# Patient Record
Sex: MALE | Race: WHITE | NOT HISPANIC OR LATINO | Employment: UNEMPLOYED | URBAN - METROPOLITAN AREA
[De-identification: names, ages, dates, MRNs, and addresses within clinical notes are randomized per-mention and may not be internally consistent; named-entity substitution may affect disease eponyms.]

---

## 2018-08-07 PROBLEM — F29 PSYCHOSIS: Status: ACTIVE | Noted: 2018-08-07

## 2018-08-10 PROBLEM — F19.922 SUBSTANCE-INDUCED PSYCHOTIC DISORDER WITH ONSET DURING INTOXICATION WITH DELUSION: Status: ACTIVE | Noted: 2018-08-07

## 2018-08-10 PROBLEM — Z87.820 HISTORY OF TRAUMATIC BRAIN INJURY: Chronic | Status: ACTIVE | Noted: 2018-08-10

## 2018-08-10 PROBLEM — F19.950 SUBSTANCE-INDUCED PSYCHOTIC DISORDER WITH ONSET DURING INTOXICATION WITH DELUSION: Status: ACTIVE | Noted: 2018-08-07

## 2018-08-10 PROBLEM — F12.20 CANNABIS USE DISORDER, MODERATE, DEPENDENCE: Chronic | Status: ACTIVE | Noted: 2018-08-10

## 2018-08-10 PROBLEM — F15.20: Chronic | Status: ACTIVE | Noted: 2018-08-10

## 2018-08-10 PROBLEM — F63.9 IMPULSE CONTROL DISORDER IN ADULT: Status: ACTIVE | Noted: 2018-08-10

## 2023-09-05 ENCOUNTER — TELEPHONE (OUTPATIENT)
Dept: PSYCHIATRY | Facility: CLINIC | Age: 43
End: 2023-09-05
Payer: MEDICAID

## 2023-09-05 NOTE — TELEPHONE ENCOUNTER
----- Message from Margarita Marcus sent at 9/5/2023  2:18 PM CDT -----  Pt request to be scheduled,Please call back at [phone].Thanks

## 2023-09-25 ENCOUNTER — TELEPHONE (OUTPATIENT)
Dept: PSYCHIATRY | Facility: CLINIC | Age: 43
End: 2023-09-25
Payer: MEDICAID

## 2023-09-25 NOTE — TELEPHONE ENCOUNTER
----- Message from Mayelin Grace sent at 9/25/2023  2:47 PM CDT -----  Type:  Sooner Apoointment Request    Caller is requesting a sooner appointment.  Caller declined first available appointment listed below.  Caller will not accept being placed on the waitlist and is requesting a message be sent to doctor.  Name of Caller:pt   When is the first available appointment?  Symptoms:adhd full evaluation  Would the patient rather a call back or a response via [facility]? Call   Best Call Back Number:[phone]  Additional Information: pt states he would like the first available appt asap.

## 2024-04-19 ENCOUNTER — HOSPITAL ENCOUNTER (EMERGENCY)
Facility: HOSPITAL | Age: 44
Discharge: PSYCHIATRIC OR SUBSTANCE USE FACILITY | End: 2024-04-20
Attending: EMERGENCY MEDICINE
Payer: MEDICAID

## 2024-04-19 DIAGNOSIS — R46.89 BEHAVIOR CONCERN: Primary | ICD-10-CM

## 2024-04-19 LAB
ALBUMIN SERPL BCP-MCNC: 4.3 G/DL (ref 3.5–5.2)
ALP SERPL-CCNC: 68 U/L (ref 55–135)
ALT SERPL W/O P-5'-P-CCNC: 34 U/L (ref 10–44)
AMPHET+METHAMPHET UR QL: ABNORMAL
ANION GAP SERPL CALC-SCNC: 10 MMOL/L (ref 8–16)
APAP SERPL-MCNC: <3 UG/ML (ref 10–20)
AST SERPL-CCNC: 44 U/L (ref 10–40)
BARBITURATES UR QL SCN>200 NG/ML: NEGATIVE
BASOPHILS # BLD AUTO: 0.03 K/UL (ref 0–0.2)
BASOPHILS NFR BLD: 0.4 % (ref 0–1.9)
BENZODIAZ UR QL SCN>200 NG/ML: NEGATIVE
BILIRUB SERPL-MCNC: 0.8 MG/DL (ref 0.1–1)
BILIRUB UR QL STRIP: NEGATIVE
BUN SERPL-MCNC: 18 MG/DL (ref 6–20)
BZE UR QL SCN: NEGATIVE
CALCIUM SERPL-MCNC: 9.7 MG/DL (ref 8.7–10.5)
CANNABINOIDS UR QL SCN: ABNORMAL
CHLORIDE SERPL-SCNC: 103 MMOL/L (ref 95–110)
CLARITY UR: CLEAR
CO2 SERPL-SCNC: 24 MMOL/L (ref 23–29)
COLOR UR: YELLOW
CREAT SERPL-MCNC: 1 MG/DL (ref 0.5–1.4)
CREAT UR-MCNC: 104.2 MG/DL (ref 23–375)
DIFFERENTIAL METHOD BLD: ABNORMAL
EOSINOPHIL # BLD AUTO: 0 K/UL (ref 0–0.5)
EOSINOPHIL NFR BLD: 0.3 % (ref 0–8)
ERYTHROCYTE [DISTWIDTH] IN BLOOD BY AUTOMATED COUNT: 12.6 % (ref 11.5–14.5)
EST. GFR  (NO RACE VARIABLE): >60 ML/MIN/1.73 M^2
ETHANOL SERPL-MCNC: <10 MG/DL
GLUCOSE SERPL-MCNC: 103 MG/DL (ref 70–110)
GLUCOSE UR QL STRIP: NEGATIVE
HCT VFR BLD AUTO: 40.6 % (ref 40–54)
HGB BLD-MCNC: 14.1 G/DL (ref 14–18)
HGB UR QL STRIP: NEGATIVE
IMM GRANULOCYTES # BLD AUTO: 0.02 K/UL (ref 0–0.04)
IMM GRANULOCYTES NFR BLD AUTO: 0.3 % (ref 0–0.5)
KETONES UR QL STRIP: NEGATIVE
LEUKOCYTE ESTERASE UR QL STRIP: ABNORMAL
LYMPHOCYTES # BLD AUTO: 1 K/UL (ref 1–4.8)
LYMPHOCYTES NFR BLD: 15.4 % (ref 18–48)
MCH RBC QN AUTO: 30.3 PG (ref 27–31)
MCHC RBC AUTO-ENTMCNC: 34.7 G/DL (ref 32–36)
MCV RBC AUTO: 87 FL (ref 82–98)
METHADONE UR QL SCN>300 NG/ML: NEGATIVE
MICROSCOPIC COMMENT: NORMAL
MONOCYTES # BLD AUTO: 0.8 K/UL (ref 0.3–1)
MONOCYTES NFR BLD: 11.8 % (ref 4–15)
NEUTROPHILS # BLD AUTO: 4.8 K/UL (ref 1.8–7.7)
NEUTROPHILS NFR BLD: 71.8 % (ref 38–73)
NITRITE UR QL STRIP: NEGATIVE
NRBC BLD-RTO: 0 /100 WBC
OPIATES UR QL SCN: NEGATIVE
PCP UR QL SCN>25 NG/ML: NEGATIVE
PH UR STRIP: 7 [PH] (ref 5–8)
PLATELET # BLD AUTO: 263 K/UL (ref 150–450)
PMV BLD AUTO: 9.5 FL (ref 9.2–12.9)
POTASSIUM SERPL-SCNC: 3.9 MMOL/L (ref 3.5–5.1)
PROT SERPL-MCNC: 7 G/DL (ref 6–8.4)
PROT UR QL STRIP: NEGATIVE
RBC # BLD AUTO: 4.66 M/UL (ref 4.6–6.2)
RBC #/AREA URNS HPF: 1 /HPF (ref 0–4)
SODIUM SERPL-SCNC: 137 MMOL/L (ref 136–145)
SP GR UR STRIP: 1.02 (ref 1–1.03)
TOXICOLOGY INFORMATION: ABNORMAL
TSH SERPL DL<=0.005 MIU/L-ACNC: 1.8 UIU/ML (ref 0.4–4)
URN SPEC COLLECT METH UR: ABNORMAL
UROBILINOGEN UR STRIP-ACNC: NEGATIVE EU/DL
WBC # BLD AUTO: 6.68 K/UL (ref 3.9–12.7)
WBC #/AREA URNS HPF: 2 /HPF (ref 0–5)

## 2024-04-19 PROCEDURE — 85025 COMPLETE CBC W/AUTO DIFF WBC: CPT | Performed by: EMERGENCY MEDICINE

## 2024-04-19 PROCEDURE — 96372 THER/PROPH/DIAG INJ SC/IM: CPT | Performed by: EMERGENCY MEDICINE

## 2024-04-19 PROCEDURE — 82077 ASSAY SPEC XCP UR&BREATH IA: CPT | Performed by: EMERGENCY MEDICINE

## 2024-04-19 PROCEDURE — 63600175 PHARM REV CODE 636 W HCPCS: Performed by: EMERGENCY MEDICINE

## 2024-04-19 PROCEDURE — 80307 DRUG TEST PRSMV CHEM ANLYZR: CPT | Performed by: EMERGENCY MEDICINE

## 2024-04-19 PROCEDURE — 99285 EMERGENCY DEPT VISIT HI MDM: CPT | Mod: 25

## 2024-04-19 PROCEDURE — 80143 DRUG ASSAY ACETAMINOPHEN: CPT | Performed by: EMERGENCY MEDICINE

## 2024-04-19 PROCEDURE — 80053 COMPREHEN METABOLIC PANEL: CPT | Performed by: EMERGENCY MEDICINE

## 2024-04-19 PROCEDURE — 81000 URINALYSIS NONAUTO W/SCOPE: CPT | Mod: 59 | Performed by: EMERGENCY MEDICINE

## 2024-04-19 PROCEDURE — 99204 OFFICE O/P NEW MOD 45 MIN: CPT | Mod: 95,AF,HB, | Performed by: PSYCHIATRY & NEUROLOGY

## 2024-04-19 PROCEDURE — 25000003 PHARM REV CODE 250: Performed by: EMERGENCY MEDICINE

## 2024-04-19 PROCEDURE — 84443 ASSAY THYROID STIM HORMONE: CPT | Performed by: EMERGENCY MEDICINE

## 2024-04-19 RX ORDER — DIPHENHYDRAMINE HYDROCHLORIDE 50 MG/ML
50 INJECTION INTRAMUSCULAR; INTRAVENOUS
Status: COMPLETED | OUTPATIENT
Start: 2024-04-19 | End: 2024-04-19

## 2024-04-19 RX ORDER — DIPHENHYDRAMINE HYDROCHLORIDE 50 MG/ML
25 INJECTION INTRAMUSCULAR; INTRAVENOUS
Status: COMPLETED | OUTPATIENT
Start: 2024-04-19 | End: 2024-04-19

## 2024-04-19 RX ORDER — ZIPRASIDONE MESYLATE 20 MG/ML
20 INJECTION, POWDER, LYOPHILIZED, FOR SOLUTION INTRAMUSCULAR
Status: COMPLETED | OUTPATIENT
Start: 2024-04-19 | End: 2024-04-19

## 2024-04-19 RX ORDER — LORAZEPAM 2 MG/ML
2 INJECTION INTRAMUSCULAR
Status: COMPLETED | OUTPATIENT
Start: 2024-04-19 | End: 2024-04-19

## 2024-04-19 RX ORDER — OLANZAPINE 5 MG/1
10 TABLET ORAL DAILY
Status: DISCONTINUED | OUTPATIENT
Start: 2024-04-19 | End: 2024-04-20 | Stop reason: HOSPADM

## 2024-04-19 RX ADMIN — LORAZEPAM 2 MG: 2 INJECTION INTRAMUSCULAR; INTRAVENOUS at 03:04

## 2024-04-19 RX ADMIN — DIPHENHYDRAMINE HYDROCHLORIDE 50 MG: 50 INJECTION, SOLUTION INTRAMUSCULAR; INTRAVENOUS at 03:04

## 2024-04-19 RX ADMIN — ZIPRASIDONE MESYLATE 20 MG: 20 INJECTION, POWDER, LYOPHILIZED, FOR SOLUTION INTRAMUSCULAR at 11:04

## 2024-04-19 RX ADMIN — OLANZAPINE 10 MG: 5 TABLET, FILM COATED ORAL at 02:04

## 2024-04-19 RX ADMIN — DIPHENHYDRAMINE HYDROCHLORIDE 25 MG: 50 INJECTION, SOLUTION INTRAMUSCULAR; INTRAVENOUS at 11:04

## 2024-04-19 NOTE — ED NOTES
Belongings: , purple lighter, vape pen, blue shirt,  khaki pants, socks, brown  shoes.. black wallet ( $16 dollars in cash).   Belongings placed outside of PEC room with sitter.

## 2024-04-19 NOTE — ED NOTES
"Patient stated to nurse "I will leave this place in the next 20 minutes if I don't talk to a psychiatrist. Patient notified that he cannot leave until cleared by a physician, and that a psychiatrist will see him shortly. Patient then stated "I am only waiting for as long as I said and I will leave out of here" MD notified.  "

## 2024-04-19 NOTE — ED PROVIDER NOTES
"SCRIBE #1 NOTE: I, Adam Dunn, am scribing for, and in the presence of, Stan White DO. I have scribed the entire note.      History      Chief Complaint   Patient presents with    Psychiatric Evaluation     Pt was sent by his psychiatrist because he has been acting unusual.  Mother states he is acting like he's in a manic state.  He has not been sleeping well.  Pt denies current SI and HI.       Review of patient's allergies indicates:  No Known Allergies     HPI   HPI    4/19/2024, 1:21 PM   History obtained from the patient and mother      History of Present Illness: Stan Montiel is a 43 y.o. male patient who presents to the Emergency Department for psychiatric evaluation. Mother states that the pt was referred to the ED by his Psychiatry following a TeleHealth visit, as the pt was exhibiting rambling speech and manic behavior. Mother states that the pt has had consistent trouble sleeping and rambling speech since returning from a work trip to [place] 2-3 weeks ago. Pt expressed that he would like to be dx with ADHD.  Symptoms are constant and moderate in severity. No mitigating or exacerbating factors reported. No associated sxs reported. Patient denies any SI, HI, hallucinations, and all other sxs at this time. Per mother, pt has been compliant with his home medications. No further complaints or concerns at this time.     Arrival mode: Personal vehicle     PCP: No, Primary Doctor       Past Medical History:  Past Medical History:   Diagnosis Date    ADHD     Amphetamine use disorder, severe, in controlled environment 8/10/2018    Anxiety     Bipolar 1 disorder     Depression     Hx of psychiatric care     Multiple head injury     multiple episodes of "falling off of slides"    Multiple personality     Psychiatric problem     Psychosis 8/7/2018    Testicular cancer     in remission, occurred approximately 10 years ago    Therapy        Past Surgical History:  No past surgical history on file. "      Family History:  Family History   Problem Relation Name Age of Onset    No Known Problems Mother      No Known Problems Father      No Known Problems Sister      Anxiety disorder Brother      Depression Brother      Drug abuse Brother      No Known Problems Maternal Aunt      No Known Problems Paternal Aunt      No Known Problems Maternal Uncle      No Known Problems Paternal Uncle      No Known Problems Maternal Grandfather      No Known Problems Maternal Grandmother      No Known Problems Paternal Grandfather      No Known Problems Paternal Grandmother      No Known Problems Cousin         Social History:  Social History     Tobacco Use    Smoking status: Heavy Smoker     Current packs/day: 2.00     Average packs/day: 2.0 packs/day for 1 year (2.0 ttl pk-yrs)     Types: Cigarettes    Smokeless tobacco: Never   Substance and Sexual Activity    Alcohol use: No    Drug use: No    Sexual activity: Yes     Partners: Female, Male     Birth control/protection: None       ROS   Review of Systems   Psychiatric/Behavioral:  Positive for behavioral problems and sleep disturbance. Negative for hallucinations and suicidal ideas. The patient is hyperactive.         (-) HI     Physical Exam      Initial Vitals [04/19/24 1254]   BP Pulse Resp Temp SpO2   127/84 (!) 124 18 98.4 °F (36.9 °C) 99 %      MAP       --          Physical Exam  Vitals reviewed.   Cardiovascular:      Comments: Tachycardic rate, regular rhythm, no murmurs  Pulmonary:      Effort: Pulmonary effort is normal.      Breath sounds: No wheezing.   Abdominal:      Palpations: Abdomen is soft.      Tenderness: There is no abdominal tenderness.   Musculoskeletal:         General: Normal range of motion.   Skin:     General: Skin is warm and dry.   Neurological:      General: No focal deficit present.      Mental Status: He is alert and oriented to person, place, and time.   Psychiatric:      Comments: Rambling thoughts, appears to be manic behavior,  "cooperative           ED Course    Critical Care    Date/Time: 4/19/2024 7:00 PM    Performed by: Stan White DO  Authorized by: Stan White DO  Direct patient critical care time: 20 minutes  Additional history critical care time: 5 minutes  Ordering / reviewing critical care time: 5 minutes  Documentation critical care time: 5 minutes  Consulting other physicians critical care time: 5 minutes  Total critical care time (exclusive of procedural time) : 40 minutes  Critical care time was exclusive of separately billable procedures and treating other patients.  Critical care was necessary to treat or prevent imminent or life-threatening deterioration of the following conditions: combative psychiatric patient.  Critical care was time spent personally by me on the following activities: evaluation of patient's response to treatment, examination of patient, ordering and performing treatments and interventions, re-evaluation of patient's condition and review of old charts.        ED Vital Signs:  Vitals:    04/19/24 1254 04/19/24 1400 04/19/24 1835   BP: 127/84  113/65   Pulse: (!) 124 100 90   Resp: 18  18   Temp: 98.4 °F (36.9 °C)  98.3 °F (36.8 °C)   TempSrc: Oral  Oral   SpO2: 99%  97%   Weight: 61.1 kg (134 lb 11.2 oz)     Height: 5' 10" (1.778 m)         Abnormal Lab Results:  Labs Reviewed   CBC W/ AUTO DIFFERENTIAL - Abnormal; Notable for the following components:       Result Value    Lymph % 15.4 (*)     All other components within normal limits   COMPREHENSIVE METABOLIC PANEL - Abnormal; Notable for the following components:    AST 44 (*)     All other components within normal limits   URINALYSIS, REFLEX TO URINE CULTURE - Abnormal; Notable for the following components:    Leukocytes, UA 1+ (*)     All other components within normal limits    Narrative:     Specimen Source->Urine   DRUG SCREEN PANEL, URINE EMERGENCY - Abnormal; Notable for the following components:    Amphetamine Screen, Ur " Presumptive Positive (*)     THC Presumptive Positive (*)     All other components within normal limits    Narrative:     Specimen Source->Urine   ACETAMINOPHEN LEVEL - Abnormal; Notable for the following components:    Acetaminophen (Tylenol), Serum <3.0 (*)     All other components within normal limits   TSH   ALCOHOL,MEDICAL (ETHANOL)   URINALYSIS MICROSCOPIC    Narrative:     Specimen Source->Urine        All Lab Results:  Results for orders placed or performed during the hospital encounter of 04/19/24   CBC auto differential   Result Value Ref Range    WBC 6.68 3.90 - 12.70 K/uL    RBC 4.66 4.60 - 6.20 M/uL    Hemoglobin 14.1 14.0 - 18.0 g/dL    Hematocrit 40.6 40.0 - 54.0 %    MCV 87 82 - 98 fL    MCH 30.3 27.0 - 31.0 pg    MCHC 34.7 32.0 - 36.0 g/dL    RDW 12.6 11.5 - 14.5 %    Platelets 263 150 - 450 K/uL    MPV 9.5 9.2 - 12.9 fL    Immature Granulocytes 0.3 0.0 - 0.5 %    Gran # (ANC) 4.8 1.8 - 7.7 K/uL    Immature Grans (Abs) 0.02 0.00 - 0.04 K/uL    Lymph # 1.0 1.0 - 4.8 K/uL    Mono # 0.8 0.3 - 1.0 K/uL    Eos # 0.0 0.0 - 0.5 K/uL    Baso # 0.03 0.00 - 0.20 K/uL    nRBC 0 0 /100 WBC    Gran % 71.8 38.0 - 73.0 %    Lymph % 15.4 (L) 18.0 - 48.0 %    Mono % 11.8 4.0 - 15.0 %    Eosinophil % 0.3 0.0 - 8.0 %    Basophil % 0.4 0.0 - 1.9 %    Differential Method Automated    Comprehensive metabolic panel   Result Value Ref Range    Sodium 137 136 - 145 mmol/L    Potassium 3.9 3.5 - 5.1 mmol/L    Chloride 103 95 - 110 mmol/L    CO2 24 23 - 29 mmol/L    Glucose 103 70 - 110 mg/dL    BUN 18 6 - 20 mg/dL    Creatinine 1.0 0.5 - 1.4 mg/dL    Calcium 9.7 8.7 - 10.5 mg/dL    Total Protein 7.0 6.0 - 8.4 g/dL    Albumin 4.3 3.5 - 5.2 g/dL    Total Bilirubin 0.8 0.1 - 1.0 mg/dL    Alkaline Phosphatase 68 55 - 135 U/L    AST 44 (H) 10 - 40 U/L    ALT 34 10 - 44 U/L    eGFR >60 >60 mL/min/1.73 m^2    Anion Gap 10 8 - 16 mmol/L   TSH   Result Value Ref Range    TSH 1.798 0.400 - 4.000 uIU/mL   Urinalysis, Reflex to Urine  Culture Urine, Clean Catch    Specimen: Urine   Result Value Ref Range    Specimen UA Urine, Clean Catch     Color, UA Yellow Yellow, Straw, Georgina    Appearance, UA Clear Clear    pH, UA 7.0 5.0 - 8.0    Specific Gravity, UA 1.020 1.005 - 1.030    Protein, UA Negative Negative    Glucose, UA Negative Negative    Ketones, UA Negative Negative    Bilirubin (UA) Negative Negative    Occult Blood UA Negative Negative    Nitrite, UA Negative Negative    Urobilinogen, UA Negative <2.0 EU/dL    Leukocytes, UA 1+ (A) Negative   Drug screen panel, emergency   Result Value Ref Range    Benzodiazepines Negative Negative    Methadone metabolites Negative Negative    Cocaine (Metab.) Negative Negative    Opiate Scrn, Ur Negative Negative    Barbiturate Screen, Ur Negative Negative    Amphetamine Screen, Ur Presumptive Positive (A) Negative    THC Presumptive Positive (A) Negative    Phencyclidine Negative Negative    Creatinine, Urine 104.2 23.0 - 375.0 mg/dL    Toxicology Information SEE COMMENT    Ethanol   Result Value Ref Range    Alcohol, Serum <10 <10 mg/dL   Acetaminophen level   Result Value Ref Range    Acetaminophen (Tylenol), Serum <3.0 (L) 10.0 - 20.0 ug/mL   Urinalysis Microscopic   Result Value Ref Range    RBC, UA 1 0 - 4 /hpf    WBC, UA 2 0 - 5 /hpf    Microscopic Comment SEE COMMENT      Imaging Results:  Imaging Results    None                 The Emergency Provider reviewed the vital signs and test results, which are outlined above.    ED Discussion     1:24 PM: The PEC hold has been issued by Dr. White at this time, as the pt is gravely disabled.    6:55 PM: Discussed pt's case with Dr. Ro (Psychiatry via TelePsych consult), who evaluated pt at bedside and recommends continuing the PEC, inpatient psychiatric admission.    6:56 PM: Pt has been medically cleared by Dr. White at this time. Reassessed pt at this time. Pt is resting comfortably and appears in no acute distress. There are no psychiatric  services offered at this facility. D/w pt all pertinent ED information and plan to transfer to psychiatric facility for psychiatric treatment. Pt verbalizes understanding. Patient being transferred by [facility] for ongoing personal protection en route. Pt has been made aware of all risks and benefits associated with transfer, including but not limited to death, MVC, loss of vital signs, and/or permanent disability. Benefits include ability to be treated at an inpatient psychiatric facility. Pt will be transported by personnel trained in CPR and CPI. Patient understands that there could be unforeseen motor vehicle accidents, inclement weather, or loss of vital signs that could result in potential death or permanent disability. All questions and complaints have been addressed at this time. Pt condition is stable at this time and is clear to transfer to psychiatric facility at this time.     ED Course as of 04/19/24 1858 Fri Apr 19, 2024   1423 Acetaminophen level(!)  Within normal limits [CD]   1424 Comprehensive metabolic panel(!)  Nonspecific findings [CD]   1424 Ethanol  Within normal limits [CD]   1424 CBC auto differential(!)  Nonspecific findings [CD]   1424 Drug screen panel, emergency(!)  Positive for amphetamine and marijuana [CD]   1424 Urinalysis, Reflex to Urine Culture Urine, Clean Catch(!)  No UTI [CD]   1855 TSH  Within normal limits [CD]   1855 Patient medically cleared for psychiatric evaluation [CD]      ED Course User Index  [CD] Stan White, DO       ED Medication(s):  Medications   OLANZapine tablet 10 mg (10 mg Oral Given 4/19/24 1445)   diphenhydrAMINE injection 50 mg (50 mg Intramuscular Given 4/19/24 1536)   LORazepam injection 2 mg (2 mg Intramuscular Given 4/19/24 1536)     New Prescriptions    No medications on file           Medical Decision Making    Medical Decision Making  Amount and/or Complexity of Data Reviewed  Independent Historian: parent  Labs: ordered. Decision-making  details documented in ED Course.    Risk  Prescription drug management.  Risk Details: Differential diagnosis includes but is not limited to:  Noncompliance with psychiatric medications, substance abuse                Scribe Attestation:   Scribe #1: I performed the above scribed service and the documentation accurately describes the services I performed. I attest to the accuracy of the note.    Attending:   Physician Attestation Statement for Scribe #1: I, Stan White DO, personally performed the services described in this documentation, as scribed by Adam Dunn, in my presence, and it is both accurate and complete.          Clinical Impression       ICD-10-CM ICD-9-CM   1. Behavior concern  R46.89 V40.9       Disposition:   Disposition: Transferred  Condition: Stable         Stan White DO  04/19/24 1859       Stan White DO  05/03/24 1019
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
## 2024-04-20 ENCOUNTER — HOSPITAL ENCOUNTER (INPATIENT)
Facility: HOSPITAL | Age: 44
LOS: 4 days | Discharge: HOME | DRG: 885 | End: 2024-04-24
Attending: PSYCHIATRY & NEUROLOGY | Admitting: PSYCHIATRY & NEUROLOGY
Payer: MEDICAID

## 2024-04-20 VITALS
DIASTOLIC BLOOD PRESSURE: 70 MMHG | WEIGHT: 134.69 LBS | RESPIRATION RATE: 16 BRPM | HEART RATE: 108 BPM | OXYGEN SATURATION: 98 % | HEIGHT: 70 IN | BODY MASS INDEX: 19.28 KG/M2 | TEMPERATURE: 99 F | SYSTOLIC BLOOD PRESSURE: 108 MMHG

## 2024-04-20 DIAGNOSIS — F29 PSYCHOSIS: ICD-10-CM

## 2024-04-20 DIAGNOSIS — F19.94 SUBSTANCE INDUCED MOOD DISORDER: Primary | ICD-10-CM

## 2024-04-20 PROBLEM — F41.1 GAD (GENERALIZED ANXIETY DISORDER): Status: ACTIVE | Noted: 2024-04-20

## 2024-04-20 PROBLEM — F19.959 PSYCHOACTIVE SUBSTANCE-INDUCED PSYCHOSIS: Status: ACTIVE | Noted: 2018-08-07

## 2024-04-20 PROCEDURE — 63600175 PHARM REV CODE 636 W HCPCS: Performed by: PSYCHIATRY & NEUROLOGY

## 2024-04-20 PROCEDURE — 25000003 PHARM REV CODE 250: Performed by: PSYCHIATRY & NEUROLOGY

## 2024-04-20 PROCEDURE — 99222 1ST HOSP IP/OBS MODERATE 55: CPT | Mod: AF,HB,, | Performed by: PSYCHIATRY & NEUROLOGY

## 2024-04-20 PROCEDURE — 11400000 HC PSYCH PRIVATE ROOM

## 2024-04-20 RX ORDER — DIPHENHYDRAMINE HCL 50 MG
50 CAPSULE ORAL EVERY 4 HOURS PRN
Status: DISCONTINUED | OUTPATIENT
Start: 2024-04-20 | End: 2024-04-20

## 2024-04-20 RX ORDER — HALOPERIDOL 5 MG/ML
5 INJECTION INTRAMUSCULAR EVERY 4 HOURS PRN
Status: DISCONTINUED | OUTPATIENT
Start: 2024-04-20 | End: 2024-04-20

## 2024-04-20 RX ORDER — DIPHENHYDRAMINE HYDROCHLORIDE 50 MG/ML
50 INJECTION INTRAMUSCULAR; INTRAVENOUS EVERY 4 HOURS PRN
Status: DISCONTINUED | OUTPATIENT
Start: 2024-04-20 | End: 2024-04-20

## 2024-04-20 RX ORDER — HALOPERIDOL 5 MG/1
5 TABLET ORAL EVERY 4 HOURS PRN
Status: DISCONTINUED | OUTPATIENT
Start: 2024-04-20 | End: 2024-04-20

## 2024-04-20 RX ORDER — OLANZAPINE 5 MG/1
5 TABLET ORAL 2 TIMES DAILY
Status: DISCONTINUED | OUTPATIENT
Start: 2024-04-20 | End: 2024-04-23

## 2024-04-20 RX ORDER — IBUPROFEN 200 MG
1 TABLET ORAL DAILY
Status: DISCONTINUED | OUTPATIENT
Start: 2024-04-20 | End: 2024-04-24 | Stop reason: HOSPADM

## 2024-04-20 RX ORDER — LORAZEPAM 2 MG/ML
2 INJECTION INTRAMUSCULAR EVERY 4 HOURS PRN
Status: DISCONTINUED | OUTPATIENT
Start: 2024-04-20 | End: 2024-04-20

## 2024-04-20 RX ORDER — CHLORPROMAZINE HYDROCHLORIDE 25 MG/ML
100 INJECTION INTRAMUSCULAR
Status: DISCONTINUED | OUTPATIENT
Start: 2024-04-20 | End: 2024-04-24 | Stop reason: HOSPADM

## 2024-04-20 RX ORDER — LORAZEPAM 1 MG/1
2 TABLET ORAL EVERY 4 HOURS PRN
Status: DISCONTINUED | OUTPATIENT
Start: 2024-04-20 | End: 2024-04-20

## 2024-04-20 RX ADMIN — LORAZEPAM 2 MG: 2 INJECTION INTRAMUSCULAR; INTRAVENOUS at 09:04

## 2024-04-20 RX ADMIN — HALOPERIDOL LACTATE 5 MG: 5 INJECTION, SOLUTION INTRAMUSCULAR at 09:04

## 2024-04-20 RX ADMIN — CHLORPROMAZINE HYDROCHLORIDE 100 MG: 25 INJECTION INTRAMUSCULAR at 08:04

## 2024-04-20 RX ADMIN — OLANZAPINE 5 MG: 5 TABLET, FILM COATED ORAL at 08:04

## 2024-04-20 RX ADMIN — DIPHENHYDRAMINE HYDROCHLORIDE 50 MG: 50 INJECTION INTRAMUSCULAR; INTRAVENOUS at 09:04

## 2024-04-20 NOTE — ED NOTES
Called patients mother Christy murcia [phone] notified  that patient was placed at [facility] and is being transported

## 2024-04-20 NOTE — SUBJECTIVE & OBJECTIVE
"Past Medical History:   Diagnosis Date    ADHD     Amphetamine use disorder, severe, in controlled environment 8/10/2018    Anxiety     Bipolar 1 disorder     Depression     Hx of psychiatric care     Multiple head injury     multiple episodes of "falling off of slides"    Multiple personality     Psychiatric problem     Psychosis 8/7/2018    Testicular cancer     in remission, occurred approximately 10 years ago    Therapy        No past surgical history on file.    Review of patient's allergies indicates:  No Known Allergies    Current Facility-Administered Medications   Medication Dose Route Frequency Provider Last Rate Last Admin    haloperidoL tablet 5 mg  5 mg Oral Q4H PRN Tim Moura MD        And    diphenhydrAMINE capsule 50 mg  50 mg Oral Q4H PRN Tim Moura MD        And    LORazepam tablet 2 mg  2 mg Oral Q4H PRN Tim Moura MD        And    haloperidol lactate injection 5 mg  5 mg Intramuscular Q4H PRTim Rayo MD   5 mg at 04/20/24 0903    And    diphenhydrAMINE injection 50 mg  50 mg Intramuscular Q4H PRTim Rayo MD   50 mg at 04/20/24 0903    And    LORazepam injection 2 mg  2 mg Intramuscular Q4H PRN Tim Moura MD   2 mg at 04/20/24 0903    nicotine 14 mg/24 hr 1 patch  1 patch Transdermal Daily Tim Moura MD         Family History       Problem Relation (Age of Onset)    Anxiety disorder Brother    Depression Brother    Drug abuse Brother    No Known Problems Mother, Father, Sister, Maternal Aunt, Paternal Aunt, Maternal Uncle, Paternal Uncle, Maternal Grandfather, Maternal Grandmother, Paternal Grandfather, Paternal Grandmother, Cousin          Tobacco Use    Smoking status: Heavy Smoker     Current packs/day: 2.00     Average packs/day: 2.0 packs/day for 1 year (2.0 ttl pk-yrs)     Types: Cigarettes    Smokeless tobacco: Never   Substance and Sexual Activity    Alcohol use: No    Drug use: No    Sexual activity: Yes     Partners: Female, " Male     Birth control/protection: None     Review of Systems   Unable to perform ROS: Psychiatric disorder     Objective:     Vital Signs (Most Recent):  Temp: 98 °F (36.7 °C) (04/20/24 1100)  Pulse: 99 (04/20/24 1100)  Resp: 18 (04/20/24 1100)  BP: 118/80 (04/20/24 1100) Vital Signs (24h Range):  Temp:  [98 °F (36.7 °C)-98.6 °F (37 °C)] 98 °F (36.7 °C)  Pulse:  [] 99  Resp:  [16-18] 18  SpO2:  [97 %-98 %] 98 %  BP: (102-118)/(60-80) 118/80     Weight: 61 kg (134 lb 7.7 oz)  Body mass index is 19.3 kg/m².     Physical Exam  Vitals and nursing note reviewed.   Constitutional:       Comments: Patient sleeping, did not answer, was sedated earlier   Pulmonary:      Effort: No respiratory distress.                Significant Labs: All pertinent labs within the past 24 hours have been reviewed.  Recent Lab Results         04/19/24  1354   04/19/24  1332        Benzodiazepines   Negative       Methadone metabolites   Negative       Phencyclidine   Negative       Acetaminophen Level <3.0  Comment: Toxic Levels:  Adults (4 hr post-ingestion).........>150 ug/mL  Adults (12 hr post-ingestion)........>40 ug/mL  Peds (2 hr post-ingestion, liquid)...>225 ug/mL           Albumin 4.3         Alcohol, Serum <10         ALP 68         ALT 34         Amphetamines, Urine   Presumptive Positive       Anion Gap 10         Appearance, UA   Clear       AST 44         Barbituates, Urine   Negative       Baso # 0.03         Basophil % 0.4         Bilirubin (UA)   Negative       BILIRUBIN TOTAL 0.8  Comment: For infants and newborns, interpretation of results should be based  on gestational age, weight and in agreement with clinical  observations.    Premature Infant recommended reference ranges:  Up to 24 hours.............<8.0 mg/dL  Up to 48 hours............<12.0 mg/dL  3-5 days..................<15.0 mg/dL  6-29 days.................<15.0 mg/dL           BUN 18         Calcium 9.7         Chloride 103         CO2 24          Cocaine, Urine   Negative       Color, UA   Yellow       Creatinine 1.0         Urine Creatinine   104.2       Differential Method Automated         eGFR >60         Eos # 0.0         Eos % 0.3         Glucose 103         Glucose, UA   Negative       Gran # (ANC) 4.8         Gran % 71.8         Hematocrit 40.6         Hemoglobin 14.1         Immature Grans (Abs) 0.02  Comment: Mild elevation in immature granulocytes is non specific and   can be seen in a variety of conditions including stress response,   acute inflammation, trauma and pregnancy. Correlation with other   laboratory and clinical findings is essential.           Immature Granulocytes 0.3         Ketones, UA   Negative       Leukocyte Esterase, UA   1+       Lymph # 1.0         Lymph % 15.4         MCH 30.3         MCHC 34.7         MCV 87         Microscopic Comment   SEE COMMENT  Comment: Other formed elements not mentioned in the report are not   present in the microscopic examination.          Mono # 0.8         Mono % 11.8         MPV 9.5         NITRITE UA   Negative       nRBC 0         Blood, UA   Negative       Opiates, Urine   Negative       pH, UA   7.0       Platelet Count 263         Potassium 3.9         PROTEIN TOTAL 7.0         Protein, UA   Negative  Comment: Recommend a 24 hour urine protein or a urine   protein/creatinine ratio if globulin induced proteinuria is  clinically suspected.         RBC 4.66         RBC, UA   1       RDW 12.6         Sodium 137         Specific Gravity, UA   1.020       Specimen UA   Urine, Clean Catch       Marijuana (THC) Metabolite   Presumptive Positive       Toxicology Information   SEE COMMENT  Comment: This screen includes the following classes of drugs at the listed   cut-off:    Benzodiazepines 200 ng/ml  Methadone 300 ng/ml  Cocaine metabolite 300 ng/ml  Opiates 300 ng/ml  Barbiturates 200 ng/ml  Amphetamines 1000 ng/ml  Marijuana metabs (THC) 50 ng/ml  Phencyclidine (PCP) 25 ng/ml    This is a  screening test. If results do not correlate with clinical   presentation, then a confirmatory send out test is advised.     This report is intended for use in clinical monitoring and management   of   patients. It is not intended for use in employment related drug   testing.         TSH 1.798         UROBILINOGEN UA   Negative       WBC, UA   2       WBC 6.68                 Significant Imaging: I have reviewed all pertinent imaging results/findings within the past 24 hours.

## 2024-04-20 NOTE — PROVIDER PROGRESS NOTES - EMERGENCY DEPT.
"SCRIBE #1 NOTE: I, Marry Montoya, am scribing for, and in the presence of, Ester Perez MD. I have scribed the entire note.       History     Chief Complaint   Patient presents with    Psychiatric Evaluation     Pt was sent by his psychiatrist because he has been acting unusual.  Mother states he is acting like he's in a manic state.  He has not been sleeping well.  Pt denies current SI and HI.     Review of patient's allergies indicates:  No Known Allergies      History of Present Illness     HPI    4/19/2024, 11:01 PM  History obtained from the  EMS and patient      History of Present Illness: Stan Montiel is a 43 y.o. male patient with a PMHx of Bipolar 1 disorder, ADHD, multiple personality, testicular cancer, anxiety, depression, psychosis, and amphetamine use disorder who presents to the Emergency Department for psychiatric evaluation. Pt was just recently discharged and set for transfer to a psych facility when while on his way to the ambulance bay he became belligerent and aggressive with staff. Pt claimed that no one told him he was being transferred to a psych facility. Pt was brought back into the ED to receive a sedative as pt is unable to transport at this time due to danger to self and staff.      PCP: No, Primary Doctor        Past Medical History:  Past Medical History:   Diagnosis Date    ADHD     Amphetamine use disorder, severe, in controlled environment 8/10/2018    Anxiety     Bipolar 1 disorder     Depression     Hx of psychiatric care     Multiple head injury     multiple episodes of "falling off of slides"    Multiple personality     Psychiatric problem     Psychosis 8/7/2018    Testicular cancer     in remission, occurred approximately 10 years ago    Therapy        Past Surgical History:  No past surgical history on file.      Family History:  Family History   Problem Relation Name Age of Onset    No Known Problems Mother      No Known Problems Father      No Known Problems " Sister      Anxiety disorder Brother      Depression Brother      Drug abuse Brother      No Known Problems Maternal Aunt      No Known Problems Paternal Aunt      No Known Problems Maternal Uncle      No Known Problems Paternal Uncle      No Known Problems Maternal Grandfather      No Known Problems Maternal Grandmother      No Known Problems Paternal Grandfather      No Known Problems Paternal Grandmother      No Known Problems Cousin         Social History:  Social History     Tobacco Use    Smoking status: Heavy Smoker     Current packs/day: 2.00     Average packs/day: 2.0 packs/day for 1 year (2.0 ttl pk-yrs)     Types: Cigarettes    Smokeless tobacco: Never   Substance and Sexual Activity    Alcohol use: No    Drug use: No    Sexual activity: Yes     Partners: Female, Male     Birth control/protection: None        Review of Systems     Review of Systems   Unable to perform ROS: Psychiatric disorder   Psychiatric/Behavioral:  Positive for agitation and behavioral problems.         Physical Exam     Initial Vitals [04/19/24 1254]   BP Pulse Resp Temp SpO2   127/84 (!) 124 18 98.4 °F (36.9 °C) 99 %      MAP       --          Physical Exam  Nursing Notes and Vital Signs Reviewed.  Constitutional: Patient is in moderate distress. Well-developed and well-nourished.  Head: Atraumatic. Normocephalic.  Eyes: PERRL. EOM intact. Conjunctivae are not pale. No scleral icterus.  ENT: Mucous membranes are moist. Oropharynx is clear and symmetric.    Neck: Supple. Full ROM. No lymphadenopathy.  Cardiovascular: Regular rate. Regular rhythm. No murmurs, rubs, or gallops. Distal pulses are 2+ and symmetric.  Pulmonary/Chest: No respiratory distress. Clear to auscultation bilaterally. No wheezing or rales.  Abdominal: Soft and non-distended.  There is no tenderness.  No rebound, guarding, or rigidity. Good bowel sounds.  Genitourinary: No CVA tenderness  Musculoskeletal: Moves all extremities. No obvious deformities. No edema. No  "calf tenderness.  Skin: Warm and dry.  Neurological:  Alert, awake, and appropriate.  Normal speech.  No acute focal neurological deficits are appreciated.  Psychiatric: Uncooperative, belligerent, unable to redirect. In violent restraints.     ED Course   Procedures  ED Vital Signs:  Vitals:    04/19/24 1254 04/19/24 1400 04/19/24 1835 04/19/24 2240   BP: 127/84  113/65 102/60   Pulse: (!) 124 100 90 98   Resp: 18  18 18   Temp: 98.4 °F (36.9 °C)  98.3 °F (36.8 °C) 98.6 °F (37 °C)   TempSrc: Oral  Oral Oral   SpO2: 99%  97% 97%   Weight: 61.1 kg (134 lb 11.2 oz)      Height: 5' 10" (1.778 m)          Abnormal Lab Results:  Labs Reviewed   CBC W/ AUTO DIFFERENTIAL - Abnormal; Notable for the following components:       Result Value    Lymph % 15.4 (*)     All other components within normal limits   COMPREHENSIVE METABOLIC PANEL - Abnormal; Notable for the following components:    AST 44 (*)     All other components within normal limits   URINALYSIS, REFLEX TO URINE CULTURE - Abnormal; Notable for the following components:    Leukocytes, UA 1+ (*)     All other components within normal limits    Narrative:     Specimen Source->Urine   DRUG SCREEN PANEL, URINE EMERGENCY - Abnormal; Notable for the following components:    Amphetamine Screen, Ur Presumptive Positive (*)     THC Presumptive Positive (*)     All other components within normal limits    Narrative:     Specimen Source->Urine   ACETAMINOPHEN LEVEL - Abnormal; Notable for the following components:    Acetaminophen (Tylenol), Serum <3.0 (*)     All other components within normal limits   TSH   ALCOHOL,MEDICAL (ETHANOL)   URINALYSIS MICROSCOPIC    Narrative:     Specimen Source->Urine        All Lab Results:  Results for orders placed or performed during the hospital encounter of 04/19/24   CBC auto differential   Result Value Ref Range    WBC 6.68 3.90 - 12.70 K/uL    RBC 4.66 4.60 - 6.20 M/uL    Hemoglobin 14.1 14.0 - 18.0 g/dL    Hematocrit 40.6 40.0 - 54.0 " %    MCV 87 82 - 98 fL    MCH 30.3 27.0 - 31.0 pg    MCHC 34.7 32.0 - 36.0 g/dL    RDW 12.6 11.5 - 14.5 %    Platelets 263 150 - 450 K/uL    MPV 9.5 9.2 - 12.9 fL    Immature Granulocytes 0.3 0.0 - 0.5 %    Gran # (ANC) 4.8 1.8 - 7.7 K/uL    Immature Grans (Abs) 0.02 0.00 - 0.04 K/uL    Lymph # 1.0 1.0 - 4.8 K/uL    Mono # 0.8 0.3 - 1.0 K/uL    Eos # 0.0 0.0 - 0.5 K/uL    Baso # 0.03 0.00 - 0.20 K/uL    nRBC 0 0 /100 WBC    Gran % 71.8 38.0 - 73.0 %    Lymph % 15.4 (L) 18.0 - 48.0 %    Mono % 11.8 4.0 - 15.0 %    Eosinophil % 0.3 0.0 - 8.0 %    Basophil % 0.4 0.0 - 1.9 %    Differential Method Automated    Comprehensive metabolic panel   Result Value Ref Range    Sodium 137 136 - 145 mmol/L    Potassium 3.9 3.5 - 5.1 mmol/L    Chloride 103 95 - 110 mmol/L    CO2 24 23 - 29 mmol/L    Glucose 103 70 - 110 mg/dL    BUN 18 6 - 20 mg/dL    Creatinine 1.0 0.5 - 1.4 mg/dL    Calcium 9.7 8.7 - 10.5 mg/dL    Total Protein 7.0 6.0 - 8.4 g/dL    Albumin 4.3 3.5 - 5.2 g/dL    Total Bilirubin 0.8 0.1 - 1.0 mg/dL    Alkaline Phosphatase 68 55 - 135 U/L    AST 44 (H) 10 - 40 U/L    ALT 34 10 - 44 U/L    eGFR >60 >60 mL/min/1.73 m^2    Anion Gap 10 8 - 16 mmol/L   TSH   Result Value Ref Range    TSH 1.798 0.400 - 4.000 uIU/mL   Urinalysis, Reflex to Urine Culture Urine, Clean Catch    Specimen: Urine   Result Value Ref Range    Specimen UA Urine, Clean Catch     Color, UA Yellow Yellow, Straw, Georgina    Appearance, UA Clear Clear    pH, UA 7.0 5.0 - 8.0    Specific Gravity, UA 1.020 1.005 - 1.030    Protein, UA Negative Negative    Glucose, UA Negative Negative    Ketones, UA Negative Negative    Bilirubin (UA) Negative Negative    Occult Blood UA Negative Negative    Nitrite, UA Negative Negative    Urobilinogen, UA Negative <2.0 EU/dL    Leukocytes, UA 1+ (A) Negative   Drug screen panel, emergency   Result Value Ref Range    Benzodiazepines Negative Negative    Methadone metabolites Negative Negative    Cocaine (Metab.) Negative  Negative    Opiate Scrn, Ur Negative Negative    Barbiturate Screen, Ur Negative Negative    Amphetamine Screen, Ur Presumptive Positive (A) Negative    THC Presumptive Positive (A) Negative    Phencyclidine Negative Negative    Creatinine, Urine 104.2 23.0 - 375.0 mg/dL    Toxicology Information SEE COMMENT    Ethanol   Result Value Ref Range    Alcohol, Serum <10 <10 mg/dL   Acetaminophen level   Result Value Ref Range    Acetaminophen (Tylenol), Serum <3.0 (L) 10.0 - 20.0 ug/mL   Urinalysis Microscopic   Result Value Ref Range    RBC, UA 1 0 - 4 /hpf    WBC, UA 2 0 - 5 /hpf    Microscopic Comment SEE COMMENT          Imaging Results:  Imaging Results    None               The Emergency Provider reviewed the vital signs and test results, which are outlined above.     ED Discussion         ED Course as of 04/19/24 2301   Fri Apr 19, 2024   1423 Acetaminophen level(!)  Within normal limits [CD]   1424 Comprehensive metabolic panel(!)  Nonspecific findings [CD]   1424 Ethanol  Within normal limits [CD]   1424 CBC auto differential(!)  Nonspecific findings [CD]   1424 Drug screen panel, emergency(!)  Positive for amphetamine and marijuana [CD]   1424 Urinalysis, Reflex to Urine Culture Urine, Clean Catch(!)  No UTI [CD]   1855 TSH  Within normal limits [CD]   1855 Patient medically cleared for psychiatric evaluation [CD]      ED Course User Index  [CD] Stan White, DO     Medical Decision Making  Amount and/or Complexity of Data Reviewed  Labs: ordered. Decision-making details documented in ED Course.    Risk  Prescription drug management.                ED Medication(s):  Medications   OLANZapine tablet 10 mg (10 mg Oral Given 4/19/24 1445)   ziprasidone injection 20 mg (has no administration in time range)   diphenhydrAMINE injection 25 mg (has no administration in time range)   diphenhydrAMINE injection 50 mg (50 mg Intramuscular Given 4/19/24 1536)   LORazepam injection 2 mg (2 mg Intramuscular Given  4/19/24 1536)       New Prescriptions    No medications on file               Scribe Attestation:   Scribe #1: I performed the above scribed service and the documentation accurately describes the services I performed. I attest to the accuracy of the note.     Attending:   Physician Attestation Statement for Scribe #1: I, Ester Perez MD, personally performed the services described in this documentation, as scribed by Marry Montoya, in my presence, and it is both accurate and complete.           Clinical Impression       ICD-10-CM ICD-9-CM   1. Behavior concern  R46.89 V40.9       Disposition:   Disposition: Transferred  Condition: Fair

## 2024-04-20 NOTE — H&P
"  [facility]) Hospital Medicine  History & Physical    Patient Name: Stan Montiel  MRN: [id number]  Patient Class: IP- Psych  Admission Date: 4/20/2024  Attending Physician: Rich Bustillo III, MD   Primary Care Provider: Bette Primary Doctor         Patient information was obtained from patient and ER records.     Subjective:     Principal Problem:Substance induced mood disorder    Chief Complaint:   Chief Complaint   Patient presents with    Psychiatric Evaluation        HPI: Patient 43-year-old male past medical history of traumatic brain injury, ADHD, methamphetamine and cannabis abuse, generalized anxiety disorder was brought to the ED for psychiatric evaluation, patient had telehealth visit with psychiatrist and was noted to be exhibiting manic behavior, mother had reported that patient had not been sleeping, had been having increasing episode for the last 2-3 weeks after returning home from a work trip in [place] about three weeks ago, patient was noted to be aggressive and agitated in the ED, had to be sedated and restrained, patient admitted to inpatient psych for further evaluation and therapy        Past Medical History:   Diagnosis Date    ADHD     Amphetamine use disorder, severe, in controlled environment 8/10/2018    Anxiety     Bipolar 1 disorder     Depression     Hx of psychiatric care     Multiple head injury     multiple episodes of "falling off of slides"    Multiple personality     Psychiatric problem     Psychosis 8/7/2018    Testicular cancer     in remission, occurred approximately 10 years ago    Therapy        No past surgical history on file.    Review of patient's allergies indicates:  No Known Allergies    Current Facility-Administered Medications   Medication Dose Route Frequency Provider Last Rate Last Admin    haloperidoL tablet 5 mg  5 mg Oral Q4H PRN Tim Moura MD        And    diphenhydrAMINE capsule 50 mg  50 mg Oral Q4H PRN Martell, " Tim WHITFIELD MD        And    LORazepam tablet 2 mg  2 mg Oral Q4H PRN Tim Moura MD        And    haloperidol lactate injection 5 mg  5 mg Intramuscular Q4H PRN Tim Moura MD   5 mg at 04/20/24 0903    And    diphenhydrAMINE injection 50 mg  50 mg Intramuscular Q4H PRN Tim Moura MD   50 mg at 04/20/24 0903    And    LORazepam injection 2 mg  2 mg Intramuscular Q4H PRN Tim Moura MD   2 mg at 04/20/24 0903    nicotine 14 mg/24 hr 1 patch  1 patch Transdermal Daily Tim Moura MD         Family History       Problem Relation (Age of Onset)    Anxiety disorder Brother    Depression Brother    Drug abuse Brother    No Known Problems Mother, Father, Sister, Maternal Aunt, Paternal Aunt, Maternal Uncle, Paternal Uncle, Maternal Grandfather, Maternal Grandmother, Paternal Grandfather, Paternal Grandmother, Cousin          Tobacco Use    Smoking status: Heavy Smoker     Current packs/day: 2.00     Average packs/day: 2.0 packs/day for 1 year (2.0 ttl pk-yrs)     Types: Cigarettes    Smokeless tobacco: Never   Substance and Sexual Activity    Alcohol use: No    Drug use: No    Sexual activity: Yes     Partners: Female, Male     Birth control/protection: None     Review of Systems   Unable to perform ROS: Psychiatric disorder     Objective:     Vital Signs (Most Recent):  Temp: 98 °F (36.7 °C) (04/20/24 1100)  Pulse: 99 (04/20/24 1100)  Resp: 18 (04/20/24 1100)  BP: 118/80 (04/20/24 1100) Vital Signs (24h Range):  Temp:  [98 °F (36.7 °C)-98.6 °F (37 °C)] 98 °F (36.7 °C)  Pulse:  [] 99  Resp:  [16-18] 18  SpO2:  [97 %-98 %] 98 %  BP: (102-118)/(60-80) 118/80     Weight: 61 kg (134 lb 7.7 oz)  Body mass index is 19.3 kg/m².     Physical Exam  Vitals and nursing note reviewed.   Constitutional:       Comments: Patient sleeping, did not answer, was sedated earlier   Pulmonary:      Effort: No respiratory distress.                Significant Labs: All pertinent labs within the past 24  hours have been reviewed.  Recent Lab Results         04/19/24  1354   04/19/24  1332        Benzodiazepines   Negative       Methadone metabolites   Negative       Phencyclidine   Negative       Acetaminophen Level <3.0  Comment: Toxic Levels:  Adults (4 hr post-ingestion).........>150 ug/mL  Adults (12 hr post-ingestion)........>40 ug/mL  Peds (2 hr post-ingestion, liquid)...>225 ug/mL           Albumin 4.3         Alcohol, Serum <10         ALP 68         ALT 34         Amphetamines, Urine   Presumptive Positive       Anion Gap 10         Appearance, UA   Clear       AST 44         Barbituates, Urine   Negative       Baso # 0.03         Basophil % 0.4         Bilirubin (UA)   Negative       BILIRUBIN TOTAL 0.8  Comment: For infants and newborns, interpretation of results should be based  on gestational age, weight and in agreement with clinical  observations.    Premature Infant recommended reference ranges:  Up to 24 hours.............<8.0 mg/dL  Up to 48 hours............<12.0 mg/dL  3-5 days..................<15.0 mg/dL  6-29 days.................<15.0 mg/dL           BUN 18         Calcium 9.7         Chloride 103         CO2 24         Cocaine, Urine   Negative       Color, UA   Yellow       Creatinine 1.0         Urine Creatinine   104.2       Differential Method Automated         eGFR >60         Eos # 0.0         Eos % 0.3         Glucose 103         Glucose, UA   Negative       Gran # (ANC) 4.8         Gran % 71.8         Hematocrit 40.6         Hemoglobin 14.1         Immature Grans (Abs) 0.02  Comment: Mild elevation in immature granulocytes is non specific and   can be seen in a variety of conditions including stress response,   acute inflammation, trauma and pregnancy. Correlation with other   laboratory and clinical findings is essential.           Immature Granulocytes 0.3         Ketones, UA   Negative       Leukocyte Esterase, UA   1+       Lymph # 1.0         Lymph % 15.4         MCH 30.3          MCHC 34.7         MCV 87         Microscopic Comment   SEE COMMENT  Comment: Other formed elements not mentioned in the report are not   present in the microscopic examination.          Mono # 0.8         Mono % 11.8         MPV 9.5         NITRITE UA   Negative       nRBC 0         Blood, UA   Negative       Opiates, Urine   Negative       pH, UA   7.0       Platelet Count 263         Potassium 3.9         PROTEIN TOTAL 7.0         Protein, UA   Negative  Comment: Recommend a 24 hour urine protein or a urine   protein/creatinine ratio if globulin induced proteinuria is  clinically suspected.         RBC 4.66         RBC, UA   1       RDW 12.6         Sodium 137         Specific Gravity, UA   1.020       Specimen UA   Urine, Clean Catch       Marijuana (THC) Metabolite   Presumptive Positive       Toxicology Information   SEE COMMENT  Comment: This screen includes the following classes of drugs at the listed   cut-off:    Benzodiazepines 200 ng/ml  Methadone 300 ng/ml  Cocaine metabolite 300 ng/ml  Opiates 300 ng/ml  Barbiturates 200 ng/ml  Amphetamines 1000 ng/ml  Marijuana metabs (THC) 50 ng/ml  Phencyclidine (PCP) 25 ng/ml    This is a screening test. If results do not correlate with clinical   presentation, then a confirmatory send out test is advised.     This report is intended for use in clinical monitoring and management   of   patients. It is not intended for use in employment related drug   testing.         TSH 1.798         UROBILINOGEN UA   Negative       WBC, UA   2       WBC 6.68                 Significant Imaging: I have reviewed all pertinent imaging results/findings within the past 24 hours.  Assessment/Plan:     * Substance induced mood disorder  Patient admitted to inpatient psych for acute psychosis, labs reviewed, med recd, meds and CBT per psych      RIVER (generalized anxiety disorder)  Med per psych      Cannabis use disorder, moderate, dependence  See above, drug screen  positive      Amphetamine use disorder, severe, dependence  See above, drug screen positive        VTE Risk Mitigation (From admission, onward)      None                            Alhaji Bell MD  Department of Hospital Medicine  [facility] ([facility])

## 2024-04-20 NOTE — CODE DOCUMENTATION
Face to Face    Called to [facility] for face to face evaluation as attending unavailable at this time. According to U staff, patient arrived to unit agitated and uncooperative striking objects. His behavior was felt to present a risk to patient and staff. The decision was made to place him in seclusion. I was contacted some time after the fact and asked to evaluate the patient. Patient found sleeping and apparently comfortable in seclusion. He is not restrained physically and appears in no distress.

## 2024-04-20 NOTE — NURSING
Pt now out of locked seclusion as he is no longer a threat to himself of others.  Pt now sitting in dining room eating supper. Pt will continue to be monitored for escalation of behavior.  Debriefing session with pt done and pt apologized for his behavior.  Verbal contract for safety done.   Pt instructed to call for any needs or concerns at all.   Verbalized understanding.  Will cont to monitor for safety.  See seclusion log in chart for further information.

## 2024-04-20 NOTE — ED NOTES
Pt placed into my care at this time. Report received from previous nurse, Vonda. Pt belongings placed in belonging bag w/ pt labels & secured in the bottom right cabinet of the psych room.
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
## 2024-04-20 NOTE — HPI
Patient 43-year-old male past medical history of traumatic brain injury, ADHD, methamphetamine and cannabis abuse, generalized anxiety disorder was brought to the ED for psychiatric evaluation, patient had telehealth visit with psychiatrist and was noted to be exhibiting manic behavior, mother had reported that patient had not been sleeping, had been having increasing episode for the last 2-3 weeks after returning home from a work trip in [place] about three weeks ago, patient was noted to be aggressive and agitated in the ED, had to be sedated and restrained, patient admitted to inpatient psych for further evaluation and therapy

## 2024-04-20 NOTE — ASSESSMENT & PLAN NOTE
Patient admitted to inpatient psych for acute psychosis, labs reviewed, med recd, meds and CBT per psych

## 2024-04-20 NOTE — NURSING
Patient arrived on unit loud, yelling, irate, aggressive. Security present. Patient throwing food and chairs in dining room. Patient escorted by security to seclusion room. Patient received prn IM injection as ordered, see emar. Patient continues to yell, punch and kick the door in the seclusion room.

## 2024-04-20 NOTE — SUBJECTIVE & OBJECTIVE
"Past Medical History:   Diagnosis Date    ADHD     Amphetamine use disorder, severe, in controlled environment 8/10/2018    Anxiety     Bipolar 1 disorder     Depression     Hx of psychiatric care     Multiple head injury     multiple episodes of "falling off of slides"    Multiple personality     Psychiatric problem     Psychosis 8/7/2018    Testicular cancer     in remission, occurred approximately 10 years ago    Therapy        No past surgical history on file.    Review of patient's allergies indicates:  No Known Allergies    Current Facility-Administered Medications   Medication Dose Route Frequency Provider Last Rate Last Admin    haloperidoL tablet 5 mg  5 mg Oral Q4H PRN Tim Moura MD        And    diphenhydrAMINE capsule 50 mg  50 mg Oral Q4H PRN Tim Moura MD        And    LORazepam tablet 2 mg  2 mg Oral Q4H PRN Tim Moura MD        And    haloperidol lactate injection 5 mg  5 mg Intramuscular Q4H PRTim Rayo MD   5 mg at 04/20/24 0903    And    diphenhydrAMINE injection 50 mg  50 mg Intramuscular Q4H PRTim Rayo MD   50 mg at 04/20/24 0903    And    LORazepam injection 2 mg  2 mg Intramuscular Q4H PRN Tim Moura MD   2 mg at 04/20/24 0903    nicotine 14 mg/24 hr 1 patch  1 patch Transdermal Daily Tim Moura MD         Family History       Problem Relation (Age of Onset)    Anxiety disorder Brother    Depression Brother    Drug abuse Brother    No Known Problems Mother, Father, Sister, Maternal Aunt, Paternal Aunt, Maternal Uncle, Paternal Uncle, Maternal Grandfather, Maternal Grandmother, Paternal Grandfather, Paternal Grandmother, Cousin          Tobacco Use    Smoking status: Heavy Smoker     Current packs/day: 2.00     Average packs/day: 2.0 packs/day for 1 year (2.0 ttl pk-yrs)     Types: Cigarettes    Smokeless tobacco: Never   Substance and Sexual Activity    Alcohol use: No    Drug use: No    Sexual activity: Yes     Partners: Female, " Male     Birth control/protection: None     Review of Systems   Unable to perform ROS: Psychiatric disorder     Objective:     Vital Signs (Most Recent):  Temp: 98 °F (36.7 °C) (04/20/24 1100)  Pulse: 99 (04/20/24 1100)  Resp: 18 (04/20/24 1100)  BP: 118/80 (04/20/24 1100) Vital Signs (24h Range):  Temp:  [98 °F (36.7 °C)-98.6 °F (37 °C)] 98 °F (36.7 °C)  Pulse:  [] 99  Resp:  [16-18] 18  SpO2:  [97 %-99 %] 98 %  BP: (102-127)/(60-84) 118/80     Weight: 61 kg (134 lb 7.7 oz)  Body mass index is 19.3 kg/m².     Physical Exam  Vitals and nursing note reviewed.   Constitutional:       General: He is not in acute distress.     Appearance: He is well-developed. He is not diaphoretic.   HENT:      Head: Normocephalic and atraumatic.      Nose: No congestion or rhinorrhea.   Eyes:      General: No scleral icterus.        Right eye: No discharge.         Left eye: No discharge.      Extraocular Movements: Extraocular movements intact.   Neck:      Thyroid: No thyromegaly.      Vascular: No JVD.   Cardiovascular:      Rate and Rhythm: Normal rate.   Pulmonary:      Effort: No respiratory distress.   Abdominal:      General: There is no distension.   Neurological:      Mental Status: He is alert. Mental status is at baseline.   Psychiatric:         Mood and Affect: Mood is anxious. Affect is angry.         Speech: He is communicative.         Behavior: Behavior is hyperactive.         Thought Content: Thought content does not include suicidal ideation.         Judgment: Judgment is impulsive.                Significant Labs: All pertinent labs within the past 24 hours have been reviewed.  Recent Lab Results         04/19/24  1354   04/19/24  1332        Benzodiazepines   Negative       Methadone metabolites   Negative       Phencyclidine   Negative       Acetaminophen Level <3.0  Comment: Toxic Levels:  Adults (4 hr post-ingestion).........>150 ug/mL  Adults (12 hr post-ingestion)........>40 ug/mL  Peds (2 hr  post-ingestion, liquid)...>225 ug/mL           Albumin 4.3         Alcohol, Serum <10         ALP 68         ALT 34         Amphetamines, Urine   Presumptive Positive       Anion Gap 10         Appearance, UA   Clear       AST 44         Barbituates, Urine   Negative       Baso # 0.03         Basophil % 0.4         Bilirubin (UA)   Negative       BILIRUBIN TOTAL 0.8  Comment: For infants and newborns, interpretation of results should be based  on gestational age, weight and in agreement with clinical  observations.    Premature Infant recommended reference ranges:  Up to 24 hours.............<8.0 mg/dL  Up to 48 hours............<12.0 mg/dL  3-5 days..................<15.0 mg/dL  6-29 days.................<15.0 mg/dL           BUN 18         Calcium 9.7         Chloride 103         CO2 24         Cocaine, Urine   Negative       Color, UA   Yellow       Creatinine 1.0         Urine Creatinine   104.2       Differential Method Automated         eGFR >60         Eos # 0.0         Eos % 0.3         Glucose 103         Glucose, UA   Negative       Gran # (ANC) 4.8         Gran % 71.8         Hematocrit 40.6         Hemoglobin 14.1         Immature Grans (Abs) 0.02  Comment: Mild elevation in immature granulocytes is non specific and   can be seen in a variety of conditions including stress response,   acute inflammation, trauma and pregnancy. Correlation with other   laboratory and clinical findings is essential.           Immature Granulocytes 0.3         Ketones, UA   Negative       Leukocyte Esterase, UA   1+       Lymph # 1.0         Lymph % 15.4         MCH 30.3         MCHC 34.7         MCV 87         Microscopic Comment   SEE COMMENT  Comment: Other formed elements not mentioned in the report are not   present in the microscopic examination.          Mono # 0.8         Mono % 11.8         MPV 9.5         NITRITE UA   Negative       nRBC 0         Blood, UA   Negative       Opiates, Urine   Negative       pH, UA    7.0       Platelet Count 263         Potassium 3.9         PROTEIN TOTAL 7.0         Protein, UA   Negative  Comment: Recommend a 24 hour urine protein or a urine   protein/creatinine ratio if globulin induced proteinuria is  clinically suspected.         RBC 4.66         RBC, UA   1       RDW 12.6         Sodium 137         Specific Gravity, UA   1.020       Specimen UA   Urine, Clean Catch       Marijuana (THC) Metabolite   Presumptive Positive       Toxicology Information   SEE COMMENT  Comment: This screen includes the following classes of drugs at the listed   cut-off:    Benzodiazepines 200 ng/ml  Methadone 300 ng/ml  Cocaine metabolite 300 ng/ml  Opiates 300 ng/ml  Barbiturates 200 ng/ml  Amphetamines 1000 ng/ml  Marijuana metabs (THC) 50 ng/ml  Phencyclidine (PCP) 25 ng/ml    This is a screening test. If results do not correlate with clinical   presentation, then a confirmatory send out test is advised.     This report is intended for use in clinical monitoring and management   of   patients. It is not intended for use in employment related drug   testing.         TSH 1.798         UROBILINOGEN UA   Negative       WBC, UA   2       WBC 6.68                 Significant Imaging: I have reviewed all pertinent imaging results/findings within the past 24 hours.

## 2024-04-21 PROCEDURE — 11400000 HC PSYCH PRIVATE ROOM

## 2024-04-21 PROCEDURE — 99232 SBSQ HOSP IP/OBS MODERATE 35: CPT | Mod: AF,HB,, | Performed by: PSYCHIATRY & NEUROLOGY

## 2024-04-21 PROCEDURE — 25000003 PHARM REV CODE 250: Performed by: PSYCHIATRY & NEUROLOGY

## 2024-04-21 RX ORDER — ACETAMINOPHEN 325 MG/1
650 TABLET ORAL EVERY 6 HOURS PRN
Status: DISCONTINUED | OUTPATIENT
Start: 2024-04-21 | End: 2024-04-24 | Stop reason: HOSPADM

## 2024-04-21 RX ADMIN — OLANZAPINE 5 MG: 5 TABLET, FILM COATED ORAL at 08:04

## 2024-04-21 RX ADMIN — ACETAMINOPHEN 650 MG: 325 TABLET ORAL at 08:04

## 2024-04-21 NOTE — PROGRESS NOTES
"PSYCHIATRY DAILY INPATIENT PROGRESS NOTE  SUBSEQUENT HOSPITAL VISIT    ENCOUNTER DATE: 4/21/2024  SITE: [facility]    DATE OF ADMISSION: 4/20/2024  8:46 AM  LENGTH OF STAY: 1 days      CHIEF COMPLAINT   Stan Monitel is a 43 y.o. male, seen during daily duarte rounds on the inpatient unit.  Stan Montiel presented with the chief complaint of psychosis and behavior problems      The patient was seen and examined. The chart was reviewed.     Reviewed notes from Rns and labs from the last 24 hours.    The patient's case was discussed with the treatment team/care providers today including Rns    Staff reports severe (requiring PRN medications for non redirectable, agitated behavior in order to prevent harm to self or others) behavioral or management issues.     The patient has been compliant with treatment.      Subjective 04/21/2024     Pt required seclusion yesterday for aggressive behavior. Required multiple IM PRNs prior to seclusion being discontinued. Agitation has since improved without any major events overnight.     Pt reports he is here because he did not sleep enough, so his doctor "ordered my mom to call the  and have be brought here." States that he went without sleep for one night. Believes this was due to "a whole messy room that I needed to deal with and I didn't get it done." Denies using any substances prior to this event. Then admits to using THC, which he reports using "almost never because I can never get a hold of it." States that using large amounts of nicotine + THC in the past have caused similar episodes.     Pt reports he takes vyvanse as prescribed and his mom controls it. Explicitly denies abusing it.     Pt reports he has "been fighting not to have depression, not that it made a difference." Attempted multiple lines of questioning and pt is not able to clearly express whether he is currently depressed, or provide timeline. "Yall can wake me up, yall can drug me. I am " "not crazy." Pt then states he is too dehydrated to drink water, "I just want to get hydrated so I can be back to normal."    Denies all psychotic symptoms. Denies MAXWELL.     Pt becomes increasingly agitated throughout attempted interview. Interview is terminated to avoid further escalation.     The patient reports side effects of  sedation .          Psychiatric ROS (observed, reported, or endorsed/denied):  Depressed mood - endorses  Interest/pleasure/anhedonia: RENEE  Guilt/hopelessness/worthlessness - RENEE  Changes in Sleep - Yes  Changes in Appetite - RENEE  Changes in Concentration - RENEE  Changes in Energy - RENEE  PMA/R- No  Suicidal- active/passive ideations - No  Homicidal ideations: active/passive ideations - No    Hallucinations - No  Delusions - No  Disorganized behavior - No  Disorganized speech - No  Negative symptoms - No    Elevated mood - No  Decreased need for sleep - Yes  Grandiosity - Yes  Racing thoughts - No  Impulsivity - Yes  Irritability- Yes  Increased energy - Yes  Distractibility - No  Increase in goal-directed activity or PMA- No    Symptoms of RIVER - No  Symptoms of Panic Disorder- No  Symptoms of PTSD - No        Overall progress: Patient is showing mild improvement     Medical ROS  ROS      PAST MEDICAL HISTORY   Past Medical History:   Diagnosis Date    ADHD     Amphetamine use disorder, severe, in controlled environment 8/10/2018    Anxiety     Bipolar 1 disorder     Depression     Hx of psychiatric care     Multiple head injury     multiple episodes of "falling off of slides"    Multiple personality     Psychiatric problem     Psychosis 8/7/2018    Testicular cancer     in remission, occurred approximately 10 years ago    Therapy            PSYCHOTROPIC MEDICATIONS   Scheduled Meds:  Current Facility-Administered Medications   Medication Dose Route Frequency    nicotine  1 patch Transdermal Daily    OLANZapine  5 mg Oral BID     Continuous Infusions:  Current Facility-Administered Medications " "  Medication Dose Route Frequency Last Rate Last Admin     PRN Meds:.  Current Facility-Administered Medications:     chlorproMAZINE, 100 mg, Intramuscular, Q1H PRN        EXAMINATION    VITALS   Vitals:    04/20/24 1100 04/20/24 1315 04/20/24 1930 04/21/24 0800   BP: 118/80 110/78 121/82 129/64   BP Location:    Left arm   Patient Position:    Sitting   Pulse: 99 96 (!) 124 106   Resp: 18 20 18 18   Temp: 98 °F (36.7 °C) 97.8 °F (36.6 °C) 98.1 °F (36.7 °C) 98.1 °F (36.7 °C)   TempSrc:    Oral   SpO2:  100% 100%    Weight: 61 kg (134 lb 7.7 oz)      Height: 5' 10" (1.778 m)          Body mass index is 19.3 kg/m².        CONSTITUTIONAL  General Appearance: unremarkable, age appropriate    MUSCULOSKELETAL  Muscle Strength and Tone:no tremor, no tic  Abnormal Involuntary Movements: No  Gait and Station: non-ataxic    PSYCHIATRIC   Level of Consciousness: awake and alert   Orientation: person, place, and situation  Grooming: Casually dressed and Disheveled  Psychomotor Behavior: reluctant to participate, hostile  Speech: loud, pressured  Language: grossly intact  Mood: fine  Affect: Angry and Labile  Thought Process: Goal directed  Associations: intact   Thought Content: DENIES suicidal ideation, DENIES homicidal ideation, and no delusions  Perceptions: denies hallucinations  Memory: Able to recall past events, Remote intact, and Recent intact  Attention:Impaired to some degree  Fund of Knowledge: Aware of current events and Vocabulary appropriate   Estimate if Intelligence:  Average based on work/education history, vocabulary and mental status exam  Insight: limited awareness of illness  Judgment: limited        DIAGNOSTIC TESTING   Laboratory Results  No results found for this or any previous visit (from the past 24 hour(s)).          MEDICAL DECISION MAKING      ASSESSMENT:   Psychosis  - R/o SIPD     H/o ADHD   H/o TBI     R/o Amphetamine Abuse (does have Rx for stimulant)  THC Abuse        PROBLEM LIST AND " MANAGEMENT PLANS    Psychosis  - Zyprexa 5mg PO BID  - cannot r/o substance induced at this time    ADHD  - Holding stimulant due to current symptoms    THC Abuse  - Pt counseled    R/o amphetamine abuse  - Clinical picture is consistent with SIPD likely from stimulant, though per pt and mom she controls and hands out his vyvanse  - Counseled.             Discussed diagnosis, risks and benefits of proposed treatment vs alternative treatments vs no treatment, potential side effects of these treatments and the inherent unpredictability of treatment. The patient expresses understanding of the above and displays the capacity to agree with this treatment given said understanding. Patient also agrees that, currently, the benefits outweigh the risks and would like to pursue/continue treatment at this time.      DISCHARGE PLANNING  Expected Disposition Plan: Home or Self Care      NEED FOR CONTINUED HOSPITALIZATION  Psychiatric illness continues to pose a potential threat to life or bodily function, of self or others, thereby requiring the need for continued inpatient psychiatric hospitalization: Yes, due to: significant psychotic thought disorder, aggressive neuroleptic titration, and aggressive behavior, as evidenced by:  Ongoing concerns with Active HI/Threatening behavior.    Protective inpatient pyschiatric hospitalization required while a safe disposition plan is enacted: Yes    Patient stabilized and ready for discharge from inpatient psychiatric unit: No        STAFF:   Darryn Chino MD  Psychiatry

## 2024-04-21 NOTE — PLAN OF CARE
Problem: Adult Behavioral Health Plan of Care  Goal: Plan of Care Review  Outcome: Ongoing, Progressing  Goal: Patient-Specific Goal (Individualization)  Outcome: Ongoing, Progressing  Goal: Adheres to Safety Considerations for Self and Others  Outcome: Ongoing, Progressing  Goal: Absence of New-Onset Illness or Injury  Outcome: Ongoing, Progressing  Goal: Optimized Coping Skills in Response to Life Stressors  Outcome: Ongoing, Progressing  Goal: Develops/Participates in Therapeutic [place] to Support Successful Transition  Outcome: Ongoing, Progressing  Goal: Rounds/Family Conference  Outcome: Ongoing, Progressing     Problem: Behavior Regulation Impairment (Psychotic Signs/Symptoms)  Goal: Improved Behavioral Control (Psychotic Signs/Symptoms)  Outcome: Ongoing, Progressing

## 2024-04-22 PROCEDURE — 25000003 PHARM REV CODE 250: Performed by: PSYCHIATRY & NEUROLOGY

## 2024-04-22 PROCEDURE — 90833 PSYTX W PT W E/M 30 MIN: CPT | Mod: SA,HB,, | Performed by: PSYCHIATRY & NEUROLOGY

## 2024-04-22 PROCEDURE — 99232 SBSQ HOSP IP/OBS MODERATE 35: CPT | Mod: SA,HB,, | Performed by: PSYCHIATRY & NEUROLOGY

## 2024-04-22 PROCEDURE — 11400000 HC PSYCH PRIVATE ROOM

## 2024-04-22 RX ADMIN — OLANZAPINE 5 MG: 5 TABLET, FILM COATED ORAL at 08:04

## 2024-04-22 RX ADMIN — OLANZAPINE 5 MG: 5 TABLET, FILM COATED ORAL at 09:04

## 2024-04-22 NOTE — PLAN OF CARE
Pt. Is awake, alert and oriented x 4. He denied any c/o suicidal or homicidal ideations. He denied feeling, anxious, agitated or depressed at this time. He took his medications without diff. Pt. Was noticed to remain withdrawn to his room most of this shift. Encouraged Pt. To verbalize his feelings when needed. Will cont. To monitor Pt.  Problem: Adult Behavioral Health Plan of Care  Goal: Plan of Care Review  Outcome: Ongoing, Progressing  Goal: Patient-Specific Goal (Individualization)  Outcome: Ongoing, Progressing  Goal: Adheres to Safety Considerations for Self and Others  Outcome: Ongoing, Progressing  Goal: Absence of New-Onset Illness or Injury  Outcome: Ongoing, Progressing  Goal: Optimized Coping Skills in Response to Life Stressors  Outcome: Ongoing, Progressing  Goal: Develops/Participates in Therapeutic [place] to Support Successful Transition  Outcome: Ongoing, Progressing  Goal: Rounds/Family Conference  Outcome: Ongoing, Progressing     Problem: Fall Injury Risk  Goal: Absence of Fall and Fall-Related Injury  Outcome: Ongoing, Progressing     Problem: Behavior Regulation Impairment (Psychotic Signs/Symptoms)  Goal: Improved Behavioral Control (Psychotic Signs/Symptoms)  Outcome: Ongoing, Progressing     Problem: Cognitive Impairment (Psychotic Signs/Symptoms)  Goal: Optimal Cognitive Function (Psychotic Signs/Symptoms)  Outcome: Ongoing, Progressing     Problem: Decreased Participation and Engagement (Psychotic Signs/Symptoms)  Goal: Increased Participation and Engagement (Psychotic Signs/Symptoms)  Outcome: Ongoing, Progressing     Problem: Mood Impairment (Psychotic Signs/Symptoms)  Goal: Improved Mood Symptoms (Psychotic Signs/Symptoms)  Outcome: Ongoing, Progressing     Problem: Psychomotor Impairment (Psychotic Signs/Symptoms)  Goal: Improved Psychomotor Symptoms (Psychotic Signs/Symptoms)  Outcome: Ongoing, Progressing     Problem: Sensory Perception Impairment (Psychotic  Signs/Symptoms)  Goal: Decreased Sensory Symptoms (Psychotic Signs/Symptoms)  Outcome: Ongoing, Progressing     Problem: Sleep Disturbance (Psychotic Signs/Symptoms)  Goal: Improved Sleep (Psychotic Signs/Symptoms)  Outcome: Ongoing, Progressing     Problem: Social, Occupational or Functional Impairment (Psychotic Signs/Symptoms)  Goal: Enhanced Social, Occupational or Functional Skills (Psychotic Signs/Symptoms)  Outcome: Ongoing, Progressing     Problem: Activity and Energy Impairment (Anxiety Signs/Symptoms)  Goal: Optimized Energy Level (Anxiety Signs/Symptoms)  Outcome: Ongoing, Progressing     Problem: Cognitive Impairment (Anxiety Signs/Symptoms)  Goal: Optimized Cognitive Function (Anxiety Signs/Symptoms)  Outcome: Ongoing, Progressing     Problem: Mood Impairment (Anxiety Signs/Symptoms)  Goal: Improved Mood Symptoms (Anxiety Signs/Symptoms)  Outcome: Ongoing, Progressing     Problem: Sleep Impairment (Anxiety Signs/Symptoms)  Goal: Improved Sleep (Anxiety Signs/Symptoms)  Outcome: Ongoing, Progressing     Problem: Social, Occupational or Functional Impairment (Anxiety Signs/Symptoms)  Goal: Enhanced Social, Occupational or Functional Skills (Anxiety Signs/Symptoms)  Outcome: Ongoing, Progressing     Problem: Somatic Disturbance (Anxiety Signs/Symptoms)  Goal: Improved Somatic Symptoms (Anxiety Signs/Symptoms)  Outcome: Ongoing, Progressing     Problem: Activity and Energy Impairment (Excessive Substance Use)  Goal: Optimized Energy Level (Excessive Substance Use)  Outcome: Ongoing, Progressing     Problem: Behavior Regulation Impairment (Excessive Substance Use)  Goal: Improved Behavioral Control (Excessive Substance Use)  Outcome: Ongoing, Progressing     Problem: Decreased Participation and Engagement (Excessive Substance Use)  Goal: Increased Participation and Engagement (Excessive Substance Use)  Outcome: Ongoing, Progressing     Problem: Physiologic Impairment (Excessive Substance Use)  Goal: Improved  Physiologic Symptoms (Excessive Substance Use)  Outcome: Ongoing, Progressing     Problem: Social, Occupational or Functional Impairment (Excessive Substance Use)  Goal: Enhanced Social, Occupational or Functional Skills (Excessive Substance Use)  Outcome: Ongoing, Progressing

## 2024-04-22 NOTE — PLAN OF CARE
Problem: Adult Behavioral Health Plan of Care  Goal: Plan of Care Review  Outcome: Ongoing, Progressing  Goal: Patient-Specific Goal (Individualization)  Outcome: Ongoing, Progressing  Goal: Adheres to Safety Considerations for Self and Others  Outcome: Ongoing, Progressing  Goal: Absence of New-Onset Illness or Injury  Outcome: Ongoing, Progressing  Goal: Optimized Coping Skills in Response to Life Stressors  Outcome: Ongoing, Progressing     Problem: Fall Injury Risk  Goal: Absence of Fall and Fall-Related Injury  Outcome: Ongoing, Progressing     Problem: Behavior Regulation Impairment (Psychotic Signs/Symptoms)  Goal: Improved Behavioral Control (Psychotic Signs/Symptoms)  Outcome: Ongoing, Progressing     Problem: Cognitive Impairment (Psychotic Signs/Symptoms)  Goal: Optimal Cognitive Function (Psychotic Signs/Symptoms)  Outcome: Ongoing, Progressing     Problem: Decreased Participation and Engagement (Psychotic Signs/Symptoms)  Goal: Increased Participation and Engagement (Psychotic Signs/Symptoms)  Outcome: Ongoing, Progressing     Problem: Mood Impairment (Psychotic Signs/Symptoms)  Goal: Improved Mood Symptoms (Psychotic Signs/Symptoms)  Outcome: Ongoing, Progressing     Problem: Psychomotor Impairment (Psychotic Signs/Symptoms)  Goal: Improved Psychomotor Symptoms (Psychotic Signs/Symptoms)  Outcome: Ongoing, Progressing     Problem: Sensory Perception Impairment (Psychotic Signs/Symptoms)  Goal: Decreased Sensory Symptoms (Psychotic Signs/Symptoms)  Outcome: Ongoing, Progressing     Problem: Sleep Disturbance (Psychotic Signs/Symptoms)  Goal: Improved Sleep (Psychotic Signs/Symptoms)  Outcome: Ongoing, Progressing     Problem: Social, Occupational or Functional Impairment (Psychotic Signs/Symptoms)  Goal: Enhanced Social, Occupational or Functional Skills (Psychotic Signs/Symptoms)  Outcome: Ongoing, Progressing     Problem: Activity and Energy Impairment (Anxiety Signs/Symptoms)  Goal: Optimized  Energy Level (Anxiety Signs/Symptoms)  Outcome: Ongoing, Progressing     Problem: Cognitive Impairment (Anxiety Signs/Symptoms)  Goal: Optimized Cognitive Function (Anxiety Signs/Symptoms)  Outcome: Ongoing, Progressing     Problem: Mood Impairment (Anxiety Signs/Symptoms)  Goal: Improved Mood Symptoms (Anxiety Signs/Symptoms)  Outcome: Ongoing, Progressing     Problem: Sleep Impairment (Anxiety Signs/Symptoms)  Goal: Improved Sleep (Anxiety Signs/Symptoms)  Outcome: Ongoing, Progressing     Problem: Social, Occupational or Functional Impairment (Anxiety Signs/Symptoms)  Goal: Enhanced Social, Occupational or Functional Skills (Anxiety Signs/Symptoms)  Outcome: Ongoing, Progressing     Problem: Somatic Disturbance (Anxiety Signs/Symptoms)  Goal: Improved Somatic Symptoms (Anxiety Signs/Symptoms)  Outcome: Ongoing, Progressing     Problem: Activity and Energy Impairment (Excessive Substance Use)  Goal: Optimized Energy Level (Excessive Substance Use)  Outcome: Ongoing, Progressing     Problem: Behavior Regulation Impairment (Excessive Substance Use)  Goal: Improved Behavioral Control (Excessive Substance Use)  Outcome: Ongoing, Progressing     Problem: Decreased Participation and Engagement (Excessive Substance Use)  Goal: Increased Participation and Engagement (Excessive Substance Use)  Outcome: Ongoing, Progressing     Problem: Physiologic Impairment (Excessive Substance Use)  Goal: Improved Physiologic Symptoms (Excessive Substance Use)  Outcome: Ongoing, Progressing     Problem: Social, Occupational or Functional Impairment (Excessive Substance Use)  Goal: Enhanced Social, Occupational or Functional Skills (Excessive Substance Use)  Outcome: Ongoing, Progressing

## 2024-04-22 NOTE — PROGRESS NOTES
"PSYCHIATRY DAILY INPATIENT PROGRESS NOTE  SUBSEQUENT HOSPITAL VISIT    ENCOUNTER DATE: 4/22/2024  SITE: [facility]    DATE OF ADMISSION: 4/20/2024  8:46 AM  LENGTH OF STAY: 2 days      CHIEF COMPLAINT   Stan Montiel is a 43 y.o. male, seen during daily duarte rounds on the inpatient unit.  Stan Montiel presented with the chief complaint of psychosis and behavior problems      The patient was seen and examined. The chart was reviewed.     Reviewed notes from Rns and labs from the last 24 hours.    The patient's case was discussed with the treatment team/care providers today including Rns    Staff reports severe (requiring PRN medications for non redirectable, agitated behavior in order to prevent harm to self or others) behavioral or management issues.     The patient has been compliant with treatment.      Subjective 04/22/2024         Patient reports mood is "good", affect is anxious- patient is jittery throughout assessment. Rapid speech noted, however content is grossly linear. Patient does require some redirection and interrupts several times during conversation. Pt denies previous psychiatric diagnoses other than ADHD. He endorses poor sleep for the last "few weeks" which he attributes to "taking too much of the THC liquid that I bought in [place]."Reports he has been sleeping "great"  Since start of hospitalization.     Attempted to discuss patient's behavioral issues reported at admission. Pt states that he "blacked out a few times" and does not remember the episodes. He reports experiencing these blackouts in the past, which he attributes to "being afraid of needles" (I.e. blacking out when needing to receive an injection, etc.)      Psychiatric ROS (observed, reported, or endorsed/denied):  Depressed mood - less  Interest/pleasure/anhedonia: less  Guilt/hopelessness/worthlessness - less  Changes in Sleep - Yes  Changes in Appetite - denies  Changes in Concentration - yes  Changes in " "Energy - yes  PMA/R- No  Suicidal- active/passive ideations - No  Homicidal ideations: active/passive ideations - No    Hallucinations - No  Delusions - No  Disorganized behavior - No  Disorganized speech - No  Negative symptoms - No    Elevated mood - No  Decreased need for sleep - Yes  Grandiosity - Yes  Racing thoughts - No  Impulsivity - Yes  Irritability- Yes  Increased energy - Yes  Distractibility - No  Increase in goal-directed activity or PMA- No    Symptoms of RIVER - No  Symptoms of Panic Disorder- No  Symptoms of PTSD - No        Overall progress: Patient is showing mild improvement     Medical ROS  Review of Systems   Constitutional: Negative.    HENT: Negative.     Eyes: Negative.    Respiratory: Negative.     Cardiovascular: Negative.    Gastrointestinal: Negative.    Genitourinary: Negative.    Musculoskeletal: Negative.    Skin: Negative.    Neurological: Negative.    Endo/Heme/Allergies: Negative.    Psychiatric/Behavioral:  The patient is nervous/anxious.        Psychotherapy:  Target symptoms: depression, anxiety , substance abuse, psychosis  Why chosen therapy is appropriate versus another modality: relevant to diagnosis, evidence based practice  Outcome monitoring methods: self-report, observation  Therapeutic intervention type: insight oriented psychotherapy, supportive psychotherapy  Topics discussed/themes: symptom recognition, substance abuse  The patient's response to the intervention is accepting. The patient's progress toward treatment goals is fair.   Duration of intervention: 16 minutes.    PAST MEDICAL HISTORY   Past Medical History:   Diagnosis Date    ADHD     Amphetamine use disorder, severe, in controlled environment 8/10/2018    Anxiety     Bipolar 1 disorder     Depression      of psychiatric care     Multiple head injury     multiple episodes of "falling off of slides"    Multiple personality     Psychiatric problem     Psychosis 8/7/2018    Testicular cancer     in remission, " occurred approximately 10 years ago    Therapy            PSYCHOTROPIC MEDICATIONS   Scheduled Meds:  Current Facility-Administered Medications   Medication Dose Route Frequency    nicotine  1 patch Transdermal Daily    OLANZapine  5 mg Oral BID     Continuous Infusions:  Current Facility-Administered Medications   Medication Dose Route Frequency Last Rate Last Admin     PRN Meds:.  Current Facility-Administered Medications:     acetaminophen, 650 mg, Oral, Q6H PRN    chlorproMAZINE, 100 mg, Intramuscular, Q1H PRN        EXAMINATION    VITALS   Vitals:    04/20/24 1930 04/21/24 0800 04/21/24 1928 04/22/24 0733   BP: 121/82 129/64 113/76 120/78   BP Location:  Left arm Left arm Left arm   Patient Position:  Sitting Sitting Sitting   Pulse: (!) 124 106 100 (!) 119   Resp: 18 18 18 20   Temp: 98.1 °F (36.7 °C) 98.1 °F (36.7 °C) 98.4 °F (36.9 °C) 97.5 °F (36.4 °C)   TempSrc:  Oral Oral Oral   SpO2: 100%  99% 99%   Weight:       Height:           Body mass index is 19.3 kg/m².        CONSTITUTIONAL  General Appearance: unremarkable, age appropriate    MUSCULOSKELETAL  Muscle Strength and Tone:no tremor, no tic  Abnormal Involuntary Movements: No  Gait and Station: non-ataxic    PSYCHIATRIC   Level of Consciousness: awake and alert   Orientation: person, place, and situation  Grooming: Casually dressed and Disheveled  Psychomotor Behavior: reluctant to participate, hostile  Speech: normal tone, normal volume, pressured, rapid  Language: grossly intact  Mood: fine  Affect: Anxious and Intense  Thought Process: Goal directed  Associations: intact   Thought Content: DENIES suicidal ideation, DENIES homicidal ideation, and no delusions  Perceptions: denies hallucinations  Memory: Able to recall past events, Remote intact, and Recent intact  Attention:Impaired to some degree  Fund of Knowledge: Aware of current events and Vocabulary appropriate   Estimate if Intelligence:  Average based on work/education history, vocabulary  and mental status exam  Insight: limited awareness of illness  Judgment: limited        DIAGNOSTIC TESTING   Laboratory Results  No results found for this or any previous visit (from the past 24 hour(s)).          MEDICAL DECISION MAKING      ASSESSMENT:   Psychosis  - R/o SIPD     H/o ADHD   H/o TBI     R/o Amphetamine Abuse (does have Rx for stimulant)  THC Abuse        PROBLEM LIST AND MANAGEMENT PLANS    Psychosis  - Zyprexa 5mg PO BID  - cannot r/o substance induced at this time  -Continue current dose    ADHD  - Holding stimulant due to current symptoms    THC Abuse  - Pt counseled    R/o amphetamine abuse  - Clinical picture is consistent with SIPD likely from stimulant, though per pt and mom she controls and hands out his vyvanse  - Counseled.             Discussed diagnosis, risks and benefits of proposed treatment vs alternative treatments vs no treatment, potential side effects of these treatments and the inherent unpredictability of treatment. The patient expresses understanding of the above and displays the capacity to agree with this treatment given said understanding. Patient also agrees that, currently, the benefits outweigh the risks and would like to pursue/continue treatment at this time.      DISCHARGE PLANNING  Expected Disposition Plan: Home or Self Care      NEED FOR CONTINUED HOSPITALIZATION  Psychiatric illness continues to pose a potential threat to life or bodily function, of self or others, thereby requiring the need for continued inpatient psychiatric hospitalization: Yes, due to: significant psychotic thought disorder, aggressive neuroleptic titration, and aggressive behavior, as evidenced by:  Ongoing concerns with Active HI/Threatening behavior.    Protective inpatient pyschiatric hospitalization required while a safe disposition plan is enacted: Yes    Patient stabilized and ready for discharge from inpatient psychiatric unit: No        STAFF:   Jesus Rocha NP  Psychiatry

## 2024-04-22 NOTE — NURSING
"Plan of care reviewed.  Pt has been in his bedroom most of the day.  Pt remains anxious, agitated when being asked questions by staff.  No violent behavior today.  Pt denies si, hi or a v hallucinations. Gravely disabled.   Pt preoccupied with being discharged.  Pt states "are y'all just going to use up my insurance then let me go. Because I know that's what y'all usually do."  Pt started working himself up while talking to the psychiatrist before walking out of the room.  Pt taking meds without side effects noted.  Appetite adequate.  No interaction with staff unless prompted.  Pt did talk to his mom on the phone today.  Pt instructed to call for any needs or concerns at all.  Verbalized understanding.  Will cont to monitor for safety.  Patient care ongoing.   "

## 2024-04-22 NOTE — NURSING
POC reviewed this shift and is ongoing.  Pt is isolated to self and room most of the day, was complaint with medications but refused his nicotine patch and presented no behavioral concerns.

## 2024-04-23 PROCEDURE — 25000003 PHARM REV CODE 250: Performed by: PSYCHIATRY & NEUROLOGY

## 2024-04-23 PROCEDURE — 99232 SBSQ HOSP IP/OBS MODERATE 35: CPT | Mod: SA,HB,, | Performed by: PSYCHIATRY & NEUROLOGY

## 2024-04-23 PROCEDURE — 63600175 PHARM REV CODE 636 W HCPCS: Performed by: PSYCHIATRY & NEUROLOGY

## 2024-04-23 PROCEDURE — 11400000 HC PSYCH PRIVATE ROOM

## 2024-04-23 PROCEDURE — 90833 PSYTX W PT W E/M 30 MIN: CPT | Mod: SA,HB,, | Performed by: PSYCHIATRY & NEUROLOGY

## 2024-04-23 RX ORDER — CHLORPROMAZINE HYDROCHLORIDE 50 MG/1
100 TABLET, FILM COATED ORAL EVERY 4 HOURS PRN
Status: DISCONTINUED | OUTPATIENT
Start: 2024-04-23 | End: 2024-04-24 | Stop reason: HOSPADM

## 2024-04-23 RX ADMIN — OLANZAPINE 7.5 MG: 5 TABLET, FILM COATED ORAL at 08:04

## 2024-04-23 RX ADMIN — ACETAMINOPHEN 650 MG: 325 TABLET ORAL at 08:04

## 2024-04-23 RX ADMIN — OLANZAPINE 5 MG: 5 TABLET, FILM COATED ORAL at 08:04

## 2024-04-23 RX ADMIN — CHLORPROMAZINE HYDROCHLORIDE 100 MG: 50 TABLET, FILM COATED ORAL at 10:04

## 2024-04-23 NOTE — PLAN OF CARE
POC reviewed and is ongoing.  Pt withdrawn to room and comes on unit as needed. No neg behaviors this shift. Med compliant with no adverse reactions. Will continue to monitor.

## 2024-04-23 NOTE — PLAN OF CARE
POC reviewed this shift and is on going. Patient is anxious, agitated, irritable, angry, hostile, showing pressured speech, and paranoid. Endorses Delusions. Denies Suicidal Ideation, Homicidal Ideation, Auditory Hallucinations, Visual Hallucinations, Tactile Hallucinations, and Gustatory Hallucinations. Demanding to be discharged. States he's no longer paying for this hospital stay. States his mom is on the way to pick him up. Pt continues to be medication compliant and staff will continue to monitor pt closely while on the unit.

## 2024-04-23 NOTE — PROGRESS NOTES
"PSYCHIATRY DAILY INPATIENT PROGRESS NOTE  SUBSEQUENT HOSPITAL VISIT    ENCOUNTER DATE: 4/23/2024  SITE: [facility]    DATE OF ADMISSION: 4/20/2024  8:46 AM  LENGTH OF STAY: 3 days      CHIEF COMPLAINT   Stan Montiel is a 43 y.o. male, seen during daily duarte rounds on the inpatient unit.  Stan Montiel presented with the chief complaint of psychosis and behavior problems      The patient was seen and examined. The chart was reviewed.     Reviewed notes from Rns and labs from the last 24 hours.    The patient's case was discussed with the treatment team/care providers today including Rns    Staff reports severe (requiring PRN medications for non redirectable, agitated behavior in order to prevent harm to self or others) behavioral or management issues.     The patient has been compliant with treatment.      Subjective 04/23/2024         Patient reports mood is "fine", affect remains anxious. Patient remains somewhat hyper-verbal, however rate of speech is decreasing moderately compared to yesterday's assessment. He remains focused on discharge and states "I've already been here a week, that's all I'm authorizing. Anything past that and  y'all are gonna have to figure out a way to pay for it." Patient was asked if he'd previously been diagnosed with bipolar disorder considering ER note in which his mother reportedly referred to the patient's behavior as "manic." Patient denies this, stating "No, I have a friend that's manic-depressive, she was probably comparing the way I was acting to him, but that's because I hadn't slept in a few days,"      Psychiatric ROS (observed, reported, or endorsed/denied):  Depressed mood - less  Interest/pleasure/anhedonia: less  Guilt/hopelessness/worthlessness - less  Changes in Sleep - Yes  Changes in Appetite - denies  Changes in Concentration - yes  Changes in Energy - yes  PMA/R- No  Suicidal- active/passive ideations - No  Homicidal ideations: active/passive " "ideations - No    Hallucinations - No  Delusions - No  Disorganized behavior - No  Disorganized speech - No  Negative symptoms - No    Elevated mood - No  Decreased need for sleep - Yes  Grandiosity - Yes  Racing thoughts - less  Impulsivity - less  Irritability- less  Increased energy - less  Distractibility - No  Increase in goal-directed activity or PMA- No    Symptoms of RIVER - No  Symptoms of Panic Disorder- No  Symptoms of PTSD - No        Overall progress: Patient is showing mild improvement     Medical ROS  Review of Systems   Constitutional: Negative.    HENT: Negative.     Eyes: Negative.    Respiratory: Negative.     Cardiovascular: Negative.    Gastrointestinal: Negative.    Genitourinary: Negative.    Musculoskeletal: Negative.    Skin: Negative.    Neurological: Negative.    Endo/Heme/Allergies: Negative.    Psychiatric/Behavioral:  The patient is nervous/anxious.        Psychotherapy:  Target symptoms: depression, anxiety , substance abuse, psychosis  Why chosen therapy is appropriate versus another modality: relevant to diagnosis, evidence based practice  Outcome monitoring methods: self-report, observation  Therapeutic intervention type: insight oriented psychotherapy, supportive psychotherapy  Topics discussed/themes: symptom recognition, substance abuse  The patient's response to the intervention is accepting. The patient's progress toward treatment goals is fair.   Duration of intervention: 16 minutes.    PAST MEDICAL HISTORY   Past Medical History:   Diagnosis Date    ADHD     Amphetamine use disorder, severe, in controlled environment 8/10/2018    Anxiety     Bipolar 1 disorder     Depression     Hx of psychiatric care     Multiple head injury     multiple episodes of "falling off of slides"    Multiple personality     Psychiatric problem     Psychosis 8/7/2018    Testicular cancer     in remission, occurred approximately 10 years ago    Therapy            PSYCHOTROPIC MEDICATIONS   Scheduled " Meds:  Current Facility-Administered Medications   Medication Dose Route Frequency    nicotine  1 patch Transdermal Daily    OLANZapine  5 mg Oral BID     Continuous Infusions:  Current Facility-Administered Medications   Medication Dose Route Frequency Last Rate Last Admin     PRN Meds:.  Current Facility-Administered Medications:     acetaminophen, 650 mg, Oral, Q6H PRN    chlorproMAZINE, 100 mg, Intramuscular, Q1H PRN        EXAMINATION    VITALS   Vitals:    04/21/24 1928 04/22/24 0733 04/22/24 1924 04/23/24 0727   BP: 113/76 120/78 134/73 127/73   BP Location: Left arm Left arm Left arm Left arm   Patient Position: Sitting Sitting Sitting Sitting   Pulse: 100 (!) 119 105 109   Resp: 18 20 16 20   Temp: 98.4 °F (36.9 °C) 97.5 °F (36.4 °C) 98.7 °F (37.1 °C) 98.6 °F (37 °C)   TempSrc: Oral Oral Oral Oral   SpO2: 99% 99% 98% 99%   Weight:       Height:           Body mass index is 19.3 kg/m².        CONSTITUTIONAL  General Appearance: unremarkable, age appropriate    MUSCULOSKELETAL  Muscle Strength and Tone:no tremor, no tic  Abnormal Involuntary Movements: No  Gait and Station: non-ataxic    PSYCHIATRIC   Level of Consciousness: awake and alert   Orientation: person, place, and situation  Grooming: Casually dressed and Disheveled  Psychomotor Behavior: reluctant to participate, hostile  Speech: normal tone, normal volume, pressured, rapid  Language: grossly intact  Mood: fine  Affect: Anxious, Appropriate, Consistent with mood, and Congruent with thought  Thought Process: Goal directed  Associations: intact   Thought Content: DENIES suicidal ideation, DENIES homicidal ideation, and no delusions  Perceptions: denies hallucinations  Memory: Able to recall past events, Remote intact, and Recent intact  Attention:Impaired to some degree  Fund of Knowledge: Aware of current events and Vocabulary appropriate   Estimate if Intelligence:  Average based on work/education history, vocabulary and mental status  exam  Insight: limited awareness of illness  Judgment: limited        DIAGNOSTIC TESTING   Laboratory Results  No results found for this or any previous visit (from the past 24 hour(s)).          MEDICAL DECISION MAKING      ASSESSMENT:   Psychosis  - R/o SIPD     H/o ADHD   H/o TBI     R/o Amphetamine Abuse (does have Rx for stimulant)  THC Abuse        PROBLEM LIST AND MANAGEMENT PLANS    Psychosis  - Zyprexa 5mg PO BID  - cannot r/o substance induced at this time  -Increase PM dose to 7.5 mg tonight (collective daily dose of 12.5), switch to all PM dosing tomorrow  ADHD  - Holding stimulant due to current symptoms    THC Abuse  - Pt counseled    R/o amphetamine abuse  - Clinical picture is consistent with SIPD likely from stimulant, though per pt and mom she controls and hands out his vyvanse  - Counseled.             Discussed diagnosis, risks and benefits of proposed treatment vs alternative treatments vs no treatment, potential side effects of these treatments and the inherent unpredictability of treatment. The patient expresses understanding of the above and displays the capacity to agree with this treatment given said understanding. Patient also agrees that, currently, the benefits outweigh the risks and would like to pursue/continue treatment at this time.      DISCHARGE PLANNING  Expected Disposition Plan: Home or Self Care      NEED FOR CONTINUED HOSPITALIZATION  Psychiatric illness continues to pose a potential threat to life or bodily function, of self or others, thereby requiring the need for continued inpatient psychiatric hospitalization: Yes, due to: significant psychotic thought disorder, aggressive neuroleptic titration, and aggressive behavior, as evidenced by:  Ongoing concerns with Active HI/Threatening behavior.    Protective inpatient pyschiatric hospitalization required while a safe disposition plan is enacted: Yes    Patient stabilized and ready for discharge from inpatient psychiatric  unit: No        STAFF:   Jesus Rocha NP  Psychiatry

## 2024-04-23 NOTE — PLAN OF CARE
Collateral:   Christy Montiel, mother, [phone]    Collateral Perception of Problem:   He was vaping he can't handle nicotine or nothing like that he couldn't sleep, he was talking a lot and not making sense and he had become agitated. Basically he couldn't sleep because of the nicotine. This has happened before     Previous Psych History/Hospitalizations:  None     Suicide Attempts (how/severity):  None     How long has pt had problems (childhood dx?):  About 2 weeks     Impulse issues:  He is ADHD, jump from one project to another     History of violence:   None     Drug Use:   THC in the Vape     Alcohol Use:    Normally doesn't drink       Legal Issues:  none       Other Pertinent Info:   Does take Vyvanse for ADHD   He has Traumatic Brain Injury     Baseline:  Easy going, helps me out, my go to person     Discharge Plan:   I don't feel like he needs to stay there since he's off the nicotine and has straighten out from that   Ok to return home   Will provide transportation once discharged
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
## 2024-04-24 VITALS
HEIGHT: 70 IN | HEART RATE: 120 BPM | TEMPERATURE: 98 F | DIASTOLIC BLOOD PRESSURE: 71 MMHG | SYSTOLIC BLOOD PRESSURE: 118 MMHG | OXYGEN SATURATION: 98 % | RESPIRATION RATE: 18 BRPM | BODY MASS INDEX: 19.26 KG/M2 | WEIGHT: 134.5 LBS

## 2024-04-24 PROBLEM — F29 PSYCHOSIS: Status: ACTIVE | Noted: 2024-04-24

## 2024-04-24 PROCEDURE — 90833 PSYTX W PT W E/M 30 MIN: CPT | Mod: AF,HB,, | Performed by: PSYCHIATRY & NEUROLOGY

## 2024-04-24 PROCEDURE — S4991 NICOTINE PATCH NONLEGEND: HCPCS | Performed by: PSYCHIATRY & NEUROLOGY

## 2024-04-24 PROCEDURE — 99239 HOSP IP/OBS DSCHRG MGMT >30: CPT | Mod: AF,HB,, | Performed by: PSYCHIATRY & NEUROLOGY

## 2024-04-24 PROCEDURE — 63600175 PHARM REV CODE 636 W HCPCS: Performed by: PSYCHIATRY & NEUROLOGY

## 2024-04-24 PROCEDURE — 25000003 PHARM REV CODE 250: Performed by: PSYCHIATRY & NEUROLOGY

## 2024-04-24 RX ORDER — OLANZAPINE 15 MG/1
15 TABLET ORAL NIGHTLY
Qty: 30 TABLET | Refills: 1 | Status: SHIPPED | OUTPATIENT
Start: 2024-04-24 | End: 2025-04-24

## 2024-04-24 RX ADMIN — NICOTINE 1 PATCH: 14 PATCH, EXTENDED RELEASE TRANSDERMAL at 08:04

## 2024-04-24 RX ADMIN — CHLORPROMAZINE HYDROCHLORIDE 100 MG: 50 TABLET, FILM COATED ORAL at 03:04

## 2024-04-24 RX ADMIN — ACETAMINOPHEN 650 MG: 325 TABLET ORAL at 08:04

## 2024-04-24 NOTE — PLAN OF CARE
Problem: Adult Behavioral Health Plan of Care  Goal: Develops/Participates in Therapeutic [place] to Support Successful Transition  Intervention: Mutually Develop Transition Plan  Flowsheets (Taken 4/24/2024 1009)  Current Discharge Risk: psychiatric illness  Readmission Within the Last 30 Days: no previous admission in last 30 days  Outpatient/Agency/Support Group Needs:   outpatient medication management   outpatient psychiatric care (specify)  Transition Support: follow-up care discussed  Anticipated Discharge Disposition: home with family  Transportation Concerns: none  Patient/Family Anticipated Services at Transition:   mental health services   outpatient care  Patient/Family Anticipates Transition to: home with family  Transportation Anticipated: family or friend will provide  Concerns to be Addressed:   mental health   medication

## 2024-04-24 NOTE — PROGRESS NOTES
Psychotherapy:  Target symptoms: depression, anxiety , substance abuse, psychosis  Why chosen therapy is appropriate versus another modality: relevant to diagnosis, evidence based practice  Outcome monitoring methods: self-report, observation  Therapeutic intervention type: insight oriented psychotherapy, supportive psychotherapy  Topics discussed/themes: symptom recognition, substance abuse  Safety planning and wrap up session  The patient's response to the intervention is accepting. The patient's progress toward treatment goals is fair.   Duration of intervention: 16 minutes.    Vitaliy Juárez MD  Psychiatry

## 2024-04-24 NOTE — PLAN OF CARE
04/24/24 1026   Step 1: Warning Signs   Warning Sign 1 stress levels being too high   Warning Sign 2 needles, being restrained physically, mental health facility (phobia)   Warning Sign 3 being triggered and coping mechanisms being taken away   Step 2: Internal coping strategies - Things I can do to take my mind off my problems without contacting another person:   Coping Strategy 1 remove from stressful situations   Coping Strategy 2 rest   Coping Strategy 3 working out   Step 3: People and social settings that provide distraction:   1. Name Aditya (friend)       Phone [phone]   2. Name Nery and Shanelle Montiel (daughters)   3. Place park   4. Place outdoors    Step 4: People whom I can ask for help:   1. Person Christy Montiel       Phone [phone]   2. Person Aditya       Phone [phone]   3. Person n/a   Step 5: Professionals or agencies I can contact during a crisis:   1. Clinician Name [facility]       Phone [phone]   2. Clinician Name [facility]       Phone [phone]   3. Suicide Prevention Lifeline: 988 Suicide Prevention Lifeline 988   4. [facility]       911       Emergency Services Phone warm line [phone] wed-sun 5pm-10pm   Step 6: Making the environment safer (plan for lethal means safety)   Safe Environment Plan communicate better, anywhere that wont add to my stress   Safe Environment Optional: What is most important to me and worth living for? my kids   Safety Plan Tasks   Provided a Hard Copy to the Patient Y   Explained How to Follow the Steps Y   Discussed Facilitators and Barriers Y

## 2024-04-24 NOTE — DISCHARGE INSTRUCTIONS
Address: [address] [place], [place] [address] [place] [address]  Hours: Open ? Closes 5?PM  Phone: [phone]

## 2024-04-24 NOTE — PLAN OF CARE
Problem: Adult Behavioral Health Plan of Care  Goal: Plan of Care Review  Outcome: Progressing  Goal: Patient-Specific Goal (Individualization)  Outcome: Progressing  Goal: Adheres to Safety Considerations for Self and Others  Outcome: Progressing  Goal: Absence of New-Onset Illness or Injury  Outcome: Progressing  Goal: Optimized Coping Skills in Response to Life Stressors  Outcome: Progressing     Problem: Fall Injury Risk  Goal: Absence of Fall and Fall-Related Injury  Outcome: Progressing     Problem: Behavior Regulation Impairment (Psychotic Signs/Symptoms)  Goal: Improved Behavioral Control (Psychotic Signs/Symptoms)  Outcome: Progressing     Problem: Cognitive Impairment (Psychotic Signs/Symptoms)  Goal: Optimal Cognitive Function (Psychotic Signs/Symptoms)  Outcome: Progressing     Problem: Decreased Participation and Engagement (Psychotic Signs/Symptoms)  Goal: Increased Participation and Engagement (Psychotic Signs/Symptoms)  Outcome: Progressing     Problem: Mood Impairment (Psychotic Signs/Symptoms)  Goal: Improved Mood Symptoms (Psychotic Signs/Symptoms)  Outcome: Progressing     Problem: Psychomotor Impairment (Psychotic Signs/Symptoms)  Goal: Improved Psychomotor Symptoms (Psychotic Signs/Symptoms)  Outcome: Progressing     Problem: Sensory Perception Impairment (Psychotic Signs/Symptoms)  Goal: Decreased Sensory Symptoms (Psychotic Signs/Symptoms)  Outcome: Progressing     Problem: Sleep Disturbance (Psychotic Signs/Symptoms)  Goal: Improved Sleep (Psychotic Signs/Symptoms)  Outcome: Progressing     Problem: Social, Occupational or Functional Impairment (Psychotic Signs/Symptoms)  Goal: Enhanced Social, Occupational or Functional Skills (Psychotic Signs/Symptoms)  Outcome: Progressing     Problem: Activity and Energy Impairment (Anxiety Signs/Symptoms)  Goal: Optimized Energy Level (Anxiety Signs/Symptoms)  Outcome: Progressing     Problem: Cognitive Impairment (Anxiety Signs/Symptoms)  Goal:  Optimized Cognitive Function (Anxiety Signs/Symptoms)  Outcome: Progressing     Problem: Mood Impairment (Anxiety Signs/Symptoms)  Goal: Improved Mood Symptoms (Anxiety Signs/Symptoms)  Outcome: Progressing     Problem: Sleep Impairment (Anxiety Signs/Symptoms)  Goal: Improved Sleep (Anxiety Signs/Symptoms)  Outcome: Progressing     Problem: Social, Occupational or Functional Impairment (Anxiety Signs/Symptoms)  Goal: Enhanced Social, Occupational or Functional Skills (Anxiety Signs/Symptoms)  Outcome: Progressing     Problem: Somatic Disturbance (Anxiety Signs/Symptoms)  Goal: Improved Somatic Symptoms (Anxiety Signs/Symptoms)  Outcome: Progressing     Problem: Activity and Energy Impairment (Excessive Substance Use)  Goal: Optimized Energy Level (Excessive Substance Use)  Outcome: Progressing     Problem: Behavior Regulation Impairment (Excessive Substance Use)  Goal: Improved Behavioral Control (Excessive Substance Use)  Outcome: Progressing     Problem: Decreased Participation and Engagement (Excessive Substance Use)  Goal: Increased Participation and Engagement (Excessive Substance Use)  Outcome: Progressing     Problem: Physiologic Impairment (Excessive Substance Use)  Goal: Improved Physiologic Symptoms (Excessive Substance Use)  Outcome: Progressing     Problem: Social, Occupational or Functional Impairment (Excessive Substance Use)  Goal: Enhanced Social, Occupational or Functional Skills (Excessive Substance Use)  Outcome: Progressing

## 2024-04-24 NOTE — NURSING
POC reviewed this shift and is ongoing.  Pt was running up and down the halls, agitated, stating that he was being held past his discharge.  Pt states that the doctor told him he could go home yesterday.  Pt agreeable to taking something PO.  Pt only had an injection ordered.  Dr Juárez notified, gave verbal order for Thorazine 100 mg PO q 4HR PRN for non-redirectable agitation.      Pt given thorazine PO.  Will continue to monitor.

## 2024-04-24 NOTE — NURSING
Patient received discharge instructions, voiced understanding. Patient belongings verified and returned. Pt escorted from unit by staff in stable condition.

## 2024-04-24 NOTE — PLAN OF CARE
Pt was encouraged to attend group, but refused. Pt was offered 1:1, but refused.  Staff will continue to encourage pt to participate in process groups to verbalize feelings and develop healthy coping skills.

## 2024-04-24 NOTE — NURSING
Pt to nurse's station with complaint of continued agitation regarding discharge.  Pt reports some improvement in symptoms with medication.  Pt asked for and was given PRN Thorazine.

## 2024-04-24 NOTE — DISCHARGE SUMMARY
Discharge Summary  Psychiatry    Admit Date: 4/20/2024    Discharge Date and Time:  04/24/2024 8:32 AM    Attending Physician: Rich Bustillo III, MD     Discharge Provider: Vitaliy Juárez MD    Reason for Admission:  Psychosis     History of Present Illness:   Pt was sent by his psychiatrist because he has been acting unusual. Mother states he is acting like he's in a manic state. He has not been sleeping well. Pt denies current SI and HI       Stan Montiel is a 43 y.o. male patient who presents to the Emergency Department for psychiatric evaluation. Mother states that the pt was referred to the ED by his Psychiatry following a TeleHealth visit, as the pt was exhibiting rambling speech and manic behavior. Mother states that the pt has had consistent trouble sleeping and rambling speech since returning from a work trip to [place] 2-3 weeks ago. Pt expressed that he would like to be dx with ADHD.  Symptoms are constant and moderate in severity. No mitigating or exacerbating factors reported. No associated sxs reported. Patient denies any SI, HI, hallucinations, and all other sxs at this time. Per mother, pt has been compliant with his home medications. No further complaints or concerns at this time.      Per ED Psych MD:  On interview, patient was quite sleepy on interview, kept falling asleep sitting up.  On interview patient initially said he did not recall coming to the ED. Later admitted he did not recall coming to the hospital.  This is the extent   Mother reports patient gets prescribed vyvanse in the outpatient setting. She holds and administer patient his medications. Mother reports patient did take his medications from her yesterday and lost his pill bottle. Mother reports since starting vyvanse he has not been sleeping, acting erratically- has not slept at all in two days . Mother reports he was doing illogical things like tearing his bedroom apart, starts on projects he does not finish,  rambles on verbally and at times does not make sense with what he is saying. She thinks he is on the wrong medications. She did not indicate he has been hallucinating, or expressing any SI or HI, nor has he been violent with anyone     Per Initial Interview:  Stan Montiel is a 43 y.o. male with past psychiatric history of psychosis.      Pt in seclusion, requiring PRNs for agitation and refuses interview.         Psych ROS: RENEE due to mental acuity  Denies any consistent depression symptoms over the past 2 weeks including decreased interest/motivation/pleasure/energy/appetite/concentration/sleep.     Denies any history of manic symptoms, including decreased need for sleep, increased energy, increased goal oriented behavior, risky behavior, racing thoughts.      Denies any history of psychotic symptoms, including AVH, paranoia, thought insertion/broadcasting/withdrawal, delusions.      Denies RIVER symptoms including excess worry, tension, insomnia. Denies panic attacks.      Denies history of PTSD symptoms including flashbacks, nightmares, hypervigilance.     Denies OCD and eating disorder symptoms.    Procedures Performed: * No surgery found *    Hospital Course:    Patient was admitted to the inpatient psychiatry unit after being medically cleared in the ED. Chart and labs were reviewed. 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            During hospitalization, the patient was encouraged to go to both groups and individual counseling. Patient was monitored  for any side effects. A meeting was held with multidisciplinary team prior to discharge and pt's diagnosis, current medications, and follow up were discussed. The patient has been compliant with treatment and can adequately attend to activities of daily living in an independent manner. The patient denies any side effects. The patient denies SI, HI, plan or intent for self harm or harm to others. The patient is no longer a danger to self or others nor gravely disabled disabled. Patient discharged  in stable condition with scheduled outpatient follow up.    AIMs score was 0    He denied withdrawals or cravings    The patient reports improved symptoms as documented below. The patient is requesting discharge. The patient is currently stable for discharge home and is able/willing to attend outpatient care. The patient is hopeful, future oriented and goal directed. The patient readily discusses both short and long term goals. The patient can identify positive coping skills and social support.     Collateral from his mother reports that he is much improved; they have no concerns over him being a danger to self/others. They report that he is at his baseline.     Psychiatric ROS (observed, reported, or endorsed/denied):  Depressed mood - improved  Interest/pleasure/anhedonia:  improved  Guilt/hopelessness/worthlessness - improved  Changes in Sleep -  improved  Changes in Appetite - denies  Changes in Concentration -  improved  Changes in Energy -  improved  PMA/R- No  Suicidal- active/passive ideations - No  Homicidal ideations: active/passive ideations - No     Hallucinations - No  Delusions - No  Disorganized behavior - No  Disorganized speech - No  Negative symptoms - No     Elevated mood - No  Decreased need for sleep -  improved  Grandiosity -  improved  Racing thoughts -  improved  Impulsivity - improved  Irritability-  improved  Increased energy -  improved  Distractibility - No  Increase in goal-directed activity or  PMA- No     Symptoms of RIVER - No  Symptoms of Panic Disorder- No  Symptoms of PTSD - No         Discussed diagnosis, risks and benefits of proposed treatment vs alternative treatments vs no treatment, and potential side effects of these treatments.  The patient expresses understanding of the above and displays the capacity to agree with this treatment given said understanding.  Patient also agrees that, currently, the benefits outweigh the risks and would like to pursue treatment at this time.      Discharge MSE: stated age, casually dressed, well groomed.  No psychomotor agitation or retardation.  No abnormal involuntary movements.  Gait normal.  Speech normal, conversational.  Language fluent English. Mood fine.  Affect normal range, pleasant, euthymic.  Thought process linear.  Associations intact.  Denies suicidal or homicidal ideation.  Denies auditory hallucinations, paranoid ideation, ideas of reference.  Memory intact.  Attention intact.  Fund of knowledge intact.  Insight intact.  Judgment intact.  Alert and oriented to person, place, time.      Tobacco Usage:  Is patient a smoker? No- former smoker  Does patient want prescription for Tobacco Cessation? No  Does patient want counseling for Tobacco Cessation? No    If patient would like to quit, then over the counter nicotine patch could be used. The patient could also follow up with his PCP or psychiatric provider for other alternatives.     Tobacco Use Treatment Practical Counseling    Were the following discussed with the patient:    Recognizing danger situations:    A. Alcohol use during the first month after quitting - Yes   B. Being around smoke and/or smokers or time/situations when the patient routinely smoked - Yes   C. Triggers and/or roadblocks are the same as danger situations - Yes    2.   Developing coping skills   A. Learning new ways to manage stress - Yes   B. Exercising and/or relaxation breathing - Yes   C. Changing routines - Yes   D.  Distraction techniques to prevent tobacco use - Yes    3.   Basic information about quitting:   A. Benefits of quitting tobacco - Yes   B. How to quit techniques - Yes   C. Available resources to support quitting - Yes        Final Diagnoses:    Principal Problem: Unspecified psychotic/mood disorder   Secondary Diagnoses:   H/o ADHD   H/o TBI     R/o Amphetamine Abuse (does have Rx for stimulant)  THC Abuse    Labs:  Admission on 04/19/2024, Discharged on 04/20/2024   Component Date Value Ref Range Status    WBC 04/19/2024 6.68  3.90 - 12.70 K/uL Final    RBC 04/19/2024 4.66  4.60 - 6.20 M/uL Final    Hemoglobin 04/19/2024 14.1  14.0 - 18.0 g/dL Final    Hematocrit 04/19/2024 40.6  40.0 - 54.0 % Final    MCV 04/19/2024 87  82 - 98 fL Final    MCH 04/19/2024 30.3  27.0 - 31.0 pg Final    MCHC 04/19/2024 34.7  32.0 - 36.0 g/dL Final    RDW 04/19/2024 12.6  11.5 - 14.5 % Final    Platelets 04/19/2024 263  150 - 450 K/uL Final    MPV 04/19/2024 9.5  9.2 - 12.9 fL Final    Immature Granulocytes 04/19/2024 0.3  0.0 - 0.5 % Final    Gran # (ANC) 04/19/2024 4.8  1.8 - 7.7 K/uL Final    Immature Grans (Abs) 04/19/2024 0.02  0.00 - 0.04 K/uL Final    Lymph # 04/19/2024 1.0  1.0 - 4.8 K/uL Final    Mono # 04/19/2024 0.8  0.3 - 1.0 K/uL Final    Eos # 04/19/2024 0.0  0.0 - 0.5 K/uL Final    Baso # 04/19/2024 0.03  0.00 - 0.20 K/uL Final    nRBC 04/19/2024 0  0 /100 WBC Final    Gran % 04/19/2024 71.8  38.0 - 73.0 % Final    Lymph % 04/19/2024 15.4 (L)  18.0 - 48.0 % Final    Mono % 04/19/2024 11.8  4.0 - 15.0 % Final    Eosinophil % 04/19/2024 0.3  0.0 - 8.0 % Final    Basophil % 04/19/2024 0.4  0.0 - 1.9 % Final    Differential Method 04/19/2024 Automated   Final    Sodium 04/19/2024 137  136 - 145 mmol/L Final    Potassium 04/19/2024 3.9  3.5 - 5.1 mmol/L Final    Chloride 04/19/2024 103  95 - 110 mmol/L Final    CO2 04/19/2024 24  23 - 29 mmol/L Final    Glucose 04/19/2024 103  70 - 110 mg/dL Final    BUN 04/19/2024 18  6 -  20 mg/dL Final    Creatinine 04/19/2024 1.0  0.5 - 1.4 mg/dL Final    Calcium 04/19/2024 9.7  8.7 - 10.5 mg/dL Final    Total Protein 04/19/2024 7.0  6.0 - 8.4 g/dL Final    Albumin 04/19/2024 4.3  3.5 - 5.2 g/dL Final    Total Bilirubin 04/19/2024 0.8  0.1 - 1.0 mg/dL Final    Alkaline Phosphatase 04/19/2024 68  55 - 135 U/L Final    AST 04/19/2024 44 (H)  10 - 40 U/L Final    ALT 04/19/2024 34  10 - 44 U/L Final    eGFR 04/19/2024 >60  >60 mL/min/1.73 m^2 Final    Anion Gap 04/19/2024 10  8 - 16 mmol/L Final    TSH 04/19/2024 1.798  0.400 - 4.000 uIU/mL Final    Specimen UA 04/19/2024 Urine, Clean Catch   Final    Color, UA 04/19/2024 Yellow  Yellow, Straw, Georgina Final    Appearance, UA 04/19/2024 Clear  Clear Final    pH, UA 04/19/2024 7.0  5.0 - 8.0 Final    Specific Gravity, UA 04/19/2024 1.020  1.005 - 1.030 Final    Protein, UA 04/19/2024 Negative  Negative Final    Glucose, UA 04/19/2024 Negative  Negative Final    Ketones, UA 04/19/2024 Negative  Negative Final    Bilirubin (UA) 04/19/2024 Negative  Negative Final    Occult Blood UA 04/19/2024 Negative  Negative Final    Nitrite, UA 04/19/2024 Negative  Negative Final    Urobilinogen, UA 04/19/2024 Negative  <2.0 EU/dL Final    Leukocytes, UA 04/19/2024 1+ (A)  Negative Final    Benzodiazepines 04/19/2024 Negative  Negative Final    Methadone metabolites 04/19/2024 Negative  Negative Final    Cocaine (Metab.) 04/19/2024 Negative  Negative Final    Opiate Scrn, Ur 04/19/2024 Negative  Negative Final    Barbiturate Screen, Ur 04/19/2024 Negative  Negative Final    Amphetamine Screen, Ur 04/19/2024 Presumptive Positive (A)  Negative Final    THC 04/19/2024 Presumptive Positive (A)  Negative Final    Phencyclidine 04/19/2024 Negative  Negative Final    Creatinine, Urine 04/19/2024 104.2  23.0 - 375.0 mg/dL Final    Toxicology Information 04/19/2024 SEE COMMENT   Final    Alcohol, Serum 04/19/2024 <10  <10 mg/dL Final    Acetaminophen (Tylenol), Serum 04/19/2024  <3.0 (L)  10.0 - 20.0 ug/mL Final    RBC, UA 04/19/2024 1  0 - 4 /hpf Final    WBC, UA 04/19/2024 2  0 - 5 /hpf Final    Microscopic Comment 04/19/2024 SEE COMMENT   Final         Discharged Condition: stable and improved; not currently a danger to self/others or gravely disabled    Disposition: Home or Self Care    Is patient being discharged on multiple neuroleptics? No    Follow Up/Patient Instructions:     Take all medications as prescribed.  Attend all psychiatric and medical follow up appointments.   Abstain from all drugs and alcohol.  Call the crisis line at: [phone] for help in a crisis and emergent situations or call 911 and Return to ED for any acute worsening of your condition including suicidal or homicidal ideations      No discharge procedures on file.        Follow up apt: local [facility] [place]- see SW/discharge notes       Medications:  Reconciled Home Medications:      Medication List        START taking these medications      OLANZapine 15 MG Tab  Commonly known as: ZyPREXA  Take 1 tablet (15 mg total) by mouth every evening.            STOP taking these medications      EScitalopram oxalate 5 MG Tab  Commonly known as: LEXAPRO     risperiDONE 1 MG tablet  Commonly known as: RISPERDAL                Diet: regular     Activity as tolerated    Total time spent discharging patient: 35 minutes    Vitaliy Juárez MD  Psychiatry